# Patient Record
Sex: MALE | Race: BLACK OR AFRICAN AMERICAN | NOT HISPANIC OR LATINO | Employment: FULL TIME | URBAN - METROPOLITAN AREA
[De-identification: names, ages, dates, MRNs, and addresses within clinical notes are randomized per-mention and may not be internally consistent; named-entity substitution may affect disease eponyms.]

---

## 2018-11-25 ENCOUNTER — HOSPITAL ENCOUNTER (EMERGENCY)
Facility: HOSPITAL | Age: 23
Discharge: HOME | End: 2018-11-25
Attending: EMERGENCY MEDICINE | Admitting: EMERGENCY MEDICINE

## 2018-11-25 ENCOUNTER — APPOINTMENT (OUTPATIENT)
Dept: CT IMAGING | Facility: HOSPITAL | Age: 23
End: 2018-11-25

## 2018-11-25 VITALS
RESPIRATION RATE: 18 BRPM | DIASTOLIC BLOOD PRESSURE: 80 MMHG | WEIGHT: 156.53 LBS | OXYGEN SATURATION: 98 % | BODY MASS INDEX: 22.41 KG/M2 | HEART RATE: 72 BPM | HEIGHT: 70 IN | SYSTOLIC BLOOD PRESSURE: 122 MMHG | TEMPERATURE: 96.8 F

## 2018-11-25 DIAGNOSIS — K59.00 CONSTIPATION, UNSPECIFIED CONSTIPATION TYPE: Primary | ICD-10-CM

## 2018-11-25 LAB
ALBUMIN SERPL-MCNC: 4.3 G/DL (ref 3.5–5.2)
ALBUMIN/GLOB SERPL: 1.3 G/DL
ALP SERPL-CCNC: 48 U/L (ref 39–117)
ALT SERPL W P-5'-P-CCNC: 16 U/L (ref 1–41)
ANION GAP SERPL CALCULATED.3IONS-SCNC: 10 MMOL/L
AST SERPL-CCNC: 27 U/L (ref 1–40)
BASOPHILS # BLD AUTO: 0.01 10*3/MM3 (ref 0–0.2)
BASOPHILS NFR BLD AUTO: 0.2 % (ref 0–1.5)
BILIRUB SERPL-MCNC: 0.2 MG/DL (ref 0.1–1.2)
BILIRUB UR QL STRIP: NEGATIVE
BUN BLD-MCNC: 15 MG/DL (ref 6–20)
BUN/CREAT SERPL: 15.6 (ref 7–25)
CALCIUM SPEC-SCNC: 9.9 MG/DL (ref 8.6–10.5)
CHLORIDE SERPL-SCNC: 102 MMOL/L (ref 98–107)
CLARITY UR: CLEAR
CO2 SERPL-SCNC: 27 MMOL/L (ref 22–29)
COLOR UR: YELLOW
CREAT BLD-MCNC: 0.96 MG/DL (ref 0.76–1.27)
DEPRECATED RDW RBC AUTO: 43.2 FL (ref 37–54)
EOSINOPHIL # BLD AUTO: 0.19 10*3/MM3 (ref 0–0.7)
EOSINOPHIL NFR BLD AUTO: 3.6 % (ref 0.3–6.2)
ERYTHROCYTE [DISTWIDTH] IN BLOOD BY AUTOMATED COUNT: 13.4 % (ref 11.5–14.5)
GFR SERPL CREATININE-BSD FRML MDRD: 120 ML/MIN/1.73
GLOBULIN UR ELPH-MCNC: 3.3 GM/DL
GLUCOSE BLD-MCNC: 101 MG/DL (ref 65–99)
GLUCOSE UR STRIP-MCNC: NEGATIVE MG/DL
HCT VFR BLD AUTO: 48 % (ref 40.4–52.2)
HGB BLD-MCNC: 16 G/DL (ref 13.7–17.6)
HGB UR QL STRIP.AUTO: NEGATIVE
HOLD SPECIMEN: NORMAL
HOLD SPECIMEN: NORMAL
IMM GRANULOCYTES # BLD: 0 10*3/MM3 (ref 0–0.03)
IMM GRANULOCYTES NFR BLD: 0 % (ref 0–0.5)
KETONES UR QL STRIP: NEGATIVE
LEUKOCYTE ESTERASE UR QL STRIP.AUTO: NEGATIVE
LIPASE SERPL-CCNC: 20 U/L (ref 13–60)
LYMPHOCYTES # BLD AUTO: 2.74 10*3/MM3 (ref 0.9–4.8)
LYMPHOCYTES NFR BLD AUTO: 52 % (ref 19.6–45.3)
MCH RBC QN AUTO: 30 PG (ref 27–32.7)
MCHC RBC AUTO-ENTMCNC: 33.3 G/DL (ref 32.6–36.4)
MCV RBC AUTO: 89.9 FL (ref 79.8–96.2)
MONOCYTES # BLD AUTO: 0.44 10*3/MM3 (ref 0.2–1.2)
MONOCYTES NFR BLD AUTO: 8.3 % (ref 5–12)
NEUTROPHILS # BLD AUTO: 1.89 10*3/MM3 (ref 1.9–8.1)
NEUTROPHILS NFR BLD AUTO: 35.9 % (ref 42.7–76)
NITRITE UR QL STRIP: NEGATIVE
PH UR STRIP.AUTO: 5.5 [PH] (ref 5–8)
PLATELET # BLD AUTO: 174 10*3/MM3 (ref 140–500)
PMV BLD AUTO: 9.8 FL (ref 6–12)
POTASSIUM BLD-SCNC: 4.4 MMOL/L (ref 3.5–5.2)
PROT SERPL-MCNC: 7.6 G/DL (ref 6–8.5)
PROT UR QL STRIP: NEGATIVE
RBC # BLD AUTO: 5.34 10*6/MM3 (ref 4.6–6)
SODIUM BLD-SCNC: 139 MMOL/L (ref 136–145)
SP GR UR STRIP: 1.02 (ref 1–1.03)
UROBILINOGEN UR QL STRIP: NORMAL
WBC NRBC COR # BLD: 5.27 10*3/MM3 (ref 4.5–10.7)
WHOLE BLOOD HOLD SPECIMEN: NORMAL
WHOLE BLOOD HOLD SPECIMEN: NORMAL

## 2018-11-25 PROCEDURE — 74177 CT ABD & PELVIS W/CONTRAST: CPT

## 2018-11-25 PROCEDURE — 81003 URINALYSIS AUTO W/O SCOPE: CPT | Performed by: PHYSICIAN ASSISTANT

## 2018-11-25 PROCEDURE — 83690 ASSAY OF LIPASE: CPT | Performed by: PHYSICIAN ASSISTANT

## 2018-11-25 PROCEDURE — 85025 COMPLETE CBC W/AUTO DIFF WBC: CPT | Performed by: PHYSICIAN ASSISTANT

## 2018-11-25 PROCEDURE — 99284 EMERGENCY DEPT VISIT MOD MDM: CPT

## 2018-11-25 PROCEDURE — 25010000002 IOPAMIDOL 61 % SOLUTION: Performed by: EMERGENCY MEDICINE

## 2018-11-25 PROCEDURE — 80053 COMPREHEN METABOLIC PANEL: CPT | Performed by: PHYSICIAN ASSISTANT

## 2018-11-25 RX ORDER — POLYETHYLENE GLYCOL 3350 17 G/17G
POWDER, FOR SOLUTION ORAL
Qty: 250 G | Refills: 0 | Status: SHIPPED | OUTPATIENT
Start: 2018-11-25 | End: 2022-01-20

## 2018-11-25 RX ADMIN — IOPAMIDOL 85 ML: 612 INJECTION, SOLUTION INTRAVENOUS at 18:24

## 2018-11-25 NOTE — ED PROVIDER NOTES
MD ATTESTATION NOTE    The SAGE and I have discussed this patient's history, physical exam, and treatment plan.  I have reviewed the documentation and personally had a face to face interaction with the patient. I affirm the documentation and agree with the treatment and plan.  The attached note describes my personal findings.    Pt presents with abdominal pain that began a couple of days ago. He states that it is worse when he eats some food but better when he drinks fresh milk. He also complains of nausea but he denies vomiting.     Physical Exam  Abdomen: suprapubic and RLQ abdominal pain    Plan: review CT scans and determine treatment plan.       Reviewed CT abd/pelvis which shows a moderate amount of stool. There is no impaction, appendicitis, or colitis. Independently viewed by me. Interpreted by radiologist. Discussed with Dr. Lagunas.    1840  I informed Adriana Lovell PA-C of the CT abd/pelvis results. She will inform Pt and manage his disposition.         Luzma Zheng  11/25/18 1840       Les Kimbrough MD  11/25/18 1918

## 2018-11-25 NOTE — ED PROVIDER NOTES
EMERGENCY DEPARTMENT ENCOUNTER    Room Number:  24/24  Date seen:  11/25/2018  Time seen: 4:12 PM  PCP: Provider, No Known    HPI:  Chief complaint:Abdomen  Context:Jd Garcia is a 23 y.o. male who presents to the ED with c/o intermittent lower abdominal pain that began a couple of days ago, currently mild. Pt states the pain was severe this morning. Pt states abdominal pain is worse when he is anxious or eating certain foods, and pain is improved with drinking fresh milk or water. Pt also c/o nausea. Pt denies vomiting, diarrhea, hematuria, dysuria, but does report having some issues with constipation recently. Pt's last BM was one day ago. Pt denies Hx of abd surgeries or medical problems. Pt doesn't take any medications. HPI limited due to pt only speaking Upper sorbian.     History obtained using Upper sorbian  ID number= [id number]    Onset: gradual  Location:lower abdomen  Radiation: none  Duration: couple of days  Timing: intermittent  Character:pain  Aggravating Factors: anxious or eating certain foods  Alleviating Factors: drinking fresh milk or water  Severity: currently mild, severe this morning    ALLERGIES  Patient has no known allergies.    PAST MEDICAL HISTORY  Active Ambulatory Problems     Diagnosis Date Noted   • No Active Ambulatory Problems     Resolved Ambulatory Problems     Diagnosis Date Noted   • No Resolved Ambulatory Problems     No Additional Past Medical History       PAST SURGICAL HISTORY  History reviewed. No pertinent surgical history.    FAMILY HISTORY  History reviewed. No pertinent family history.    SOCIAL HISTORY  Social History     Socioeconomic History   • Marital status: Single     Spouse name: Not on file   • Number of children: Not on file   • Years of education: Not on file   • Highest education level: Not on file   Social Needs   • Financial resource strain: Not on file   • Food insecurity - worry: Not on file   • Food insecurity - inability: Not on file   • Transportation  needs - medical: Not on file   • Transportation needs - non-medical: Not on file   Occupational History   • Not on file   Tobacco Use   • Smoking status: Never Smoker   • Smokeless tobacco: Never Used   Substance and Sexual Activity   • Alcohol use: Not on file     Comment: rarely    • Drug use: No   • Sexual activity: Defer   Other Topics Concern   • Not on file   Social History Narrative   • Not on file       REVIEW OF SYSTEMS  Review of Systems   Constitutional: Negative for chills and fever.   HENT: Negative.    Eyes: Negative.    Respiratory: Negative for shortness of breath.    Cardiovascular: Negative for chest pain.   Gastrointestinal: Positive for abdominal pain (intermittent lower), constipation and nausea. Negative for diarrhea and vomiting.   Genitourinary: Negative.  Negative for dysuria and hematuria.   Musculoskeletal: Negative.    Skin: Negative.    Neurological: Negative.    Psychiatric/Behavioral: Negative.        PHYSICAL EXAM  ED Triage Vitals [11/25/18 1601]   Temp Heart Rate Resp BP SpO2   96.1 °F (35.6 °C) 94 18 -- 97 %      Temp src Heart Rate Source Patient Position BP Location FiO2 (%)   Tympanic Monitor -- -- --     Physical Exam   Constitutional: He is oriented to person, place, and time and well-developed, well-nourished, and in no distress.   HENT:   Head: Normocephalic and atraumatic.   Right Ear: External ear normal.   Left Ear: External ear normal.   Nose: Nose normal.   Eyes: Conjunctivae are normal.   Neck: Normal range of motion.   Cardiovascular: Normal rate and regular rhythm.   Pulmonary/Chest: Effort normal and breath sounds normal.   Abdominal: There is no tenderness.   Normal bowel sounds  Soft  Nontender  Nondistended  No rebound, guarding, or rigidity  No appreciable organomegaly  No CVA tenderness     Musculoskeletal: Normal range of motion.   Neurological: He is alert and oriented to person, place, and time.   Skin: Skin is warm and dry.   Psychiatric: Affect normal.    Nursing note and vitals reviewed.      LAB RESULTS  Recent Results (from the past 24 hour(s))   Comprehensive Metabolic Panel    Collection Time: 11/25/18  4:58 PM   Result Value Ref Range    Glucose 101 (H) 65 - 99 mg/dL    BUN 15 6 - 20 mg/dL    Creatinine 0.96 0.76 - 1.27 mg/dL    Sodium 139 136 - 145 mmol/L    Potassium 4.4 3.5 - 5.2 mmol/L    Chloride 102 98 - 107 mmol/L    CO2 27.0 22.0 - 29.0 mmol/L    Calcium 9.9 8.6 - 10.5 mg/dL    Total Protein 7.6 6.0 - 8.5 g/dL    Albumin 4.30 3.50 - 5.20 g/dL    ALT (SGPT) 16 1 - 41 U/L    AST (SGOT) 27 1 - 40 U/L    Alkaline Phosphatase 48 39 - 117 U/L    Total Bilirubin 0.2 0.1 - 1.2 mg/dL    eGFR  African Amer 120 >60 mL/min/1.73    Globulin 3.3 gm/dL    A/G Ratio 1.3 g/dL    BUN/Creatinine Ratio 15.6 7.0 - 25.0    Anion Gap 10.0 mmol/L   Lipase    Collection Time: 11/25/18  4:58 PM   Result Value Ref Range    Lipase 20 13 - 60 U/L   CBC Auto Differential    Collection Time: 11/25/18  4:58 PM   Result Value Ref Range    WBC 5.27 4.50 - 10.70 10*3/mm3    RBC 5.34 4.60 - 6.00 10*6/mm3    Hemoglobin 16.0 13.7 - 17.6 g/dL    Hematocrit 48.0 40.4 - 52.2 %    MCV 89.9 79.8 - 96.2 fL    MCH 30.0 27.0 - 32.7 pg    MCHC 33.3 32.6 - 36.4 g/dL    RDW 13.4 11.5 - 14.5 %    RDW-SD 43.2 37.0 - 54.0 fl    MPV 9.8 6.0 - 12.0 fL    Platelets 174 140 - 500 10*3/mm3    Neutrophil % 35.9 (L) 42.7 - 76.0 %    Lymphocyte % 52.0 (H) 19.6 - 45.3 %    Monocyte % 8.3 5.0 - 12.0 %    Eosinophil % 3.6 0.3 - 6.2 %    Basophil % 0.2 0.0 - 1.5 %    Immature Grans % 0.0 0.0 - 0.5 %    Neutrophils, Absolute 1.89 (L) 1.90 - 8.10 10*3/mm3    Lymphocytes, Absolute 2.74 0.90 - 4.80 10*3/mm3    Monocytes, Absolute 0.44 0.20 - 1.20 10*3/mm3    Eosinophils, Absolute 0.19 0.00 - 0.70 10*3/mm3    Basophils, Absolute 0.01 0.00 - 0.20 10*3/mm3    Immature Grans, Absolute 0.00 0.00 - 0.03 10*3/mm3   Light Blue Top    Collection Time: 11/25/18  4:58 PM   Result Value Ref Range    Extra Tube hold for add-on     Green Top (Gel)    Collection Time: 11/25/18  4:58 PM   Result Value Ref Range    Extra Tube Hold for add-ons.    Lavender Top    Collection Time: 11/25/18  4:58 PM   Result Value Ref Range    Extra Tube hold for add-on    Gold Top - SST    Collection Time: 11/25/18  4:58 PM   Result Value Ref Range    Extra Tube Hold for add-ons.    Urinalysis With Microscopic If Indicated (No Culture) - Urine, Clean Catch    Collection Time: 11/25/18  6:05 PM   Result Value Ref Range    Color, UA Yellow Yellow, Straw    Appearance, UA Clear Clear    pH, UA 5.5 5.0 - 8.0    Specific Gravity, UA 1.019 1.005 - 1.030    Glucose, UA Negative Negative    Ketones, UA Negative Negative    Bilirubin, UA Negative Negative    Blood, UA Negative Negative    Protein, UA Negative Negative    Leuk Esterase, UA Negative Negative    Nitrite, UA Negative Negative    Urobilinogen, UA 0.2 E.U./dL 0.2 - 1.0 E.U./dL       I ordered the above labs and reviewed the results    RADIOLOGY  CT Abdomen Pelvis With Contrast   Final Result          I ordered the above noted radiological studies and reviewed the images on the PACS system.     MEDICATIONS GIVEN IN ER  Medications   iopamidol (ISOVUE-300) 61 % injection 100 mL (85 mL Intravenous Given 11/25/18 1824)       PROCEDURES  Procedures      PROGRESS AND CONSULTS    Progress Notes:       1625  Discussed the plan to further evaluate with lab work and UA. Pt is agreeable.     1650 Reviewed pt's history and workup with Dr. Kimbrough.  After a bedside evaluation; Dr Kimbrough agrees with the plan of care.    1653  Ordered CT abd/pelvis    1853  Pt recheck. Pt is resting in bed comfortably. Irish  used (ID = [id number]). Notified pt of lab work, including normal UA, normal electrolytes, and normal lipase, and CT abd pelvis that shows constipation otherwise unremarkable. Discussed plan to discharge pt home with prescription for a stool softener. Pt understands and agrees with plan, all concerns  "addressed.  Return precautions and follow up instructions discussed.      Disposition vitals:  /80   Pulse 72   Temp 96.8 °F (36 °C)   Resp 18   Ht 178 cm (70.08\")   Wt 71 kg (156 lb 8.4 oz)   SpO2 98%   BMI 22.41 kg/m²       DIAGNOSIS  Final diagnoses:   Constipation, unspecified constipation type       FOLLOW UP   [facility] [id number]  [phone]  Schedule an appointment as soon as possible for a visit in 2 days  to establish a primary care provider      RX     Medication List      New Prescriptions    polyethylene glycol powder  Commonly known as:  MIRALAX  Dissolve 1 heaping tablespoon into water or juice and drink.            Documentation assistance provided by gaye Valentin for Mayra Lovell PA-C.  Information recorded by the scribe was done at my direction and has been verified and validated by me.                  Erasto Valentin  11/25/18 1903       Mayra Lovell PA  11/25/18 2009    "

## 2018-11-25 NOTE — DISCHARGE INSTRUCTIONS
Normal diet as tolerated. Drink plenty of fluids.  Take the medications as prescribed and you may discontinue it after you get good results.  Return to the ER for severe pain, intractable vomiting, fever >100.4, new or worsening symptoms, any concerns.

## 2020-06-29 ENCOUNTER — HOSPITAL ENCOUNTER (EMERGENCY)
Facility: HOSPITAL | Age: 25
Discharge: HOME | End: 2020-06-29
Attending: EMERGENCY MEDICINE | Admitting: EMERGENCY MEDICINE

## 2020-06-29 ENCOUNTER — APPOINTMENT (OUTPATIENT)
Dept: GENERAL RADIOLOGY | Facility: HOSPITAL | Age: 25
End: 2020-06-29

## 2020-06-29 VITALS
WEIGHT: 160 LBS | HEART RATE: 80 BPM | SYSTOLIC BLOOD PRESSURE: 120 MMHG | OXYGEN SATURATION: 99 % | BODY MASS INDEX: 20.53 KG/M2 | RESPIRATION RATE: 15 BRPM | DIASTOLIC BLOOD PRESSURE: 79 MMHG | HEIGHT: 74 IN | TEMPERATURE: 98.1 F

## 2020-06-29 DIAGNOSIS — M79.18 MUSCULOSKELETAL PAIN: ICD-10-CM

## 2020-06-29 DIAGNOSIS — M25.511 ACUTE PAIN OF RIGHT SHOULDER: Primary | ICD-10-CM

## 2020-06-29 PROCEDURE — 99282 EMERGENCY DEPT VISIT SF MDM: CPT

## 2020-06-29 PROCEDURE — 73030 X-RAY EXAM OF SHOULDER: CPT

## 2020-06-29 RX ORDER — IBUPROFEN 600 MG/1
600 TABLET ORAL EVERY 6 HOURS PRN
Qty: 30 TABLET | Refills: 0 | Status: SHIPPED | OUTPATIENT
Start: 2020-06-29 | End: 2022-01-20

## 2020-06-29 RX ORDER — ACETAMINOPHEN 500 MG
1000 TABLET ORAL EVERY 8 HOURS PRN
Qty: 30 TABLET | Refills: 0 | Status: SHIPPED | OUTPATIENT
Start: 2020-06-29 | End: 2020-08-20

## 2020-08-20 ENCOUNTER — OFFICE VISIT (OUTPATIENT)
Dept: FAMILY MEDICINE CLINIC | Facility: CLINIC | Age: 25
End: 2020-08-20

## 2020-08-20 VITALS
DIASTOLIC BLOOD PRESSURE: 76 MMHG | WEIGHT: 168.2 LBS | SYSTOLIC BLOOD PRESSURE: 112 MMHG | OXYGEN SATURATION: 98 % | BODY MASS INDEX: 22.78 KG/M2 | TEMPERATURE: 98.2 F | HEIGHT: 72 IN | HEART RATE: 80 BPM

## 2020-08-20 DIAGNOSIS — N20.0 KIDNEY STONE: ICD-10-CM

## 2020-08-20 DIAGNOSIS — M54.40 BILATERAL LOW BACK PAIN WITH SCIATICA, SCIATICA LATERALITY UNSPECIFIED, UNSPECIFIED CHRONICITY: ICD-10-CM

## 2020-08-20 DIAGNOSIS — K59.04 CHRONIC IDIOPATHIC CONSTIPATION: ICD-10-CM

## 2020-08-20 DIAGNOSIS — Z00.00 PHYSICAL EXAM, ANNUAL: Primary | ICD-10-CM

## 2020-08-20 PROCEDURE — 99385 PREV VISIT NEW AGE 18-39: CPT | Performed by: INTERNAL MEDICINE

## 2020-08-20 NOTE — PROGRESS NOTES
Subjective   Jd Garcia is a 25 y.o. male.     History of Present Illness   Patient was seen for a physical.  Patient's diet and physical activity were discussed at this visit.  Patient does have chronic constipation was placed on Metamucil 2 to 3 tablespoons 8 ounces of water daily.    Dictated utilizing Dragon dictation. If there are questions or for further clarification, please contact me.  The following portions of the patient's history were reviewed and updated as appropriate: allergies, current medications, past family history, past medical history, past social history, past surgical history and problem list.    Review of Systems   Constitutional: Negative for fatigue and fever.   HENT: Positive for congestion. Negative for trouble swallowing.    Eyes: Negative for discharge and visual disturbance.   Respiratory: Negative for choking and shortness of breath.    Cardiovascular: Negative for chest pain and palpitations.   Gastrointestinal: Positive for constipation. Negative for abdominal pain and blood in stool.   Endocrine: Negative.    Genitourinary: Negative for genital sores and hematuria.   Musculoskeletal: Negative for gait problem and joint swelling.   Skin: Negative for color change, pallor, rash and wound.   Allergic/Immunologic: Positive for environmental allergies. Negative for immunocompromised state.   Neurological: Negative for facial asymmetry and speech difficulty.   Psychiatric/Behavioral: Negative for hallucinations and suicidal ideas.       Objective   Physical Exam   Constitutional: He is oriented to person, place, and time. He appears well-developed and well-nourished. No distress.   HENT:   Head: Normocephalic and atraumatic.   Right Ear: External ear normal.   Left Ear: External ear normal.   Nose: Nose normal.   Mouth/Throat: Oropharynx is clear and moist. No oropharyngeal exudate.   Eyes: Pupils are equal, round, and reactive to light. Conjunctivae and EOM are normal. Right eye  exhibits no discharge. Left eye exhibits no discharge. No scleral icterus.   Neck: Normal range of motion. Neck supple. No JVD present. No tracheal deviation present. No thyromegaly present.   Cardiovascular: Normal rate, regular rhythm, normal heart sounds and intact distal pulses. Exam reveals no gallop and no friction rub.   No murmur heard.  Pulmonary/Chest: Effort normal and breath sounds normal. No stridor. No respiratory distress. He has no wheezes. He has no rales. He exhibits no tenderness.   Abdominal: Soft. Bowel sounds are normal. He exhibits no distension and no mass. There is no tenderness. There is no rebound and no guarding. No hernia.   Musculoskeletal: Normal range of motion. He exhibits no edema, tenderness or deformity.   Lymphadenopathy:     He has no cervical adenopathy.   Neurological: He is alert and oriented to person, place, and time. He displays normal reflexes. No sensory deficit. He exhibits normal muscle tone. Coordination normal.   Skin: Skin is warm and dry. No rash noted. He is not diaphoretic. No erythema. No pallor.   Psychiatric: He has a normal mood and affect. His behavior is normal. Judgment and thought content normal.   Nursing note and vitals reviewed.      Assessment/Plan 1 physical #2 labs  Jd was seen today for new pcp.    Diagnoses and all orders for this visit:    Physical exam, annual    Chronic idiopathic constipation  -     CBC (No Diff); Future  -     Comprehensive Metabolic Panel  -     Lipid Panel  -     Vitamin D 25 Hydroxy    Bilateral low back pain with sciatica, sciatica laterality unspecified, unspecified chronicity  -     CBC (No Diff); Future  -     Comprehensive Metabolic Panel  -     Lipid Panel  -     Vitamin D 25 Hydroxy    Kidney stone  -     CBC (No Diff); Future  -     Comprehensive Metabolic Panel  -     Lipid Panel  -     Vitamin D 25 Hydroxy    Other orders  -     psyllium (Metamucil Smooth Texture) 58.6 % powder; 2-3 tablespoon in 8oz  water

## 2020-08-20 NOTE — PATIENT INSTRUCTIONS
"DASH Eating Plan  DASH stands for \"Dietary Approaches to Stop Hypertension.\" The DASH eating plan is a healthy eating plan that has been shown to reduce high blood pressure (hypertension). It may also reduce your risk for type 2 diabetes, heart disease, and stroke. The DASH eating plan may also help with weight loss.  What are tips for following this plan?    General guidelines  · Avoid eating more than 2,300 mg (milligrams) of salt (sodium) a day. If you have hypertension, you may need to reduce your sodium intake to 1,500 mg a day.  · Limit alcohol intake to no more than 1 drink a day for nonpregnant women and 2 drinks a day for men. One drink equals 12 oz of beer, 5 oz of wine, or 1½ oz of hard liquor.  · Work with your health care provider to maintain a healthy body weight or to lose weight. Ask what an ideal weight is for you.  · Get at least 30 minutes of exercise that causes your heart to beat faster (aerobic exercise) most days of the week. Activities may include walking, swimming, or biking.  · Work with your health care provider or diet and nutrition specialist (dietitian) to adjust your eating plan to your individual calorie needs.  Reading food labels    · Check food labels for the amount of sodium per serving. Choose foods with less than 5 percent of the Daily Value of sodium. Generally, foods with less than 300 mg of sodium per serving fit into this eating plan.  · To find whole grains, look for the word \"whole\" as the first word in the ingredient list.  Shopping  · Buy products labeled as \"low-sodium\" or \"no salt added.\"  · Buy fresh foods. Avoid canned foods and premade or frozen meals.  Cooking  · Avoid adding salt when cooking. Use salt-free seasonings or herbs instead of table salt or sea salt. Check with your health care provider or pharmacist before using salt substitutes.  · Do not bueno foods. Cook foods using healthy methods such as baking, boiling, grilling, and broiling instead.  · Cook with " heart-healthy oils, such as olive, canola, soybean, or sunflower oil.  Meal planning  · Eat a balanced diet that includes:  ? 5 or more servings of fruits and vegetables each day. At each meal, try to fill half of your plate with fruits and vegetables.  ? Up to 6-8 servings of whole grains each day.  ? Less than 6 oz of lean meat, poultry, or fish each day. A 3-oz serving of meat is about the same size as a deck of cards. One egg equals 1 oz.  ? 2 servings of low-fat dairy each day.  ? A serving of nuts, seeds, or beans 5 times each week.  ? Heart-healthy fats. Healthy fats called Omega-3 fatty acids are found in foods such as flaxseeds and coldwater fish, like sardines, salmon, and mackerel.  · Limit how much you eat of the following:  ? Canned or prepackaged foods.  ? Food that is high in trans fat, such as fried foods.  ? Food that is high in saturated fat, such as fatty meat.  ? Sweets, desserts, sugary drinks, and other foods with added sugar.  ? Full-fat dairy products.  · Do not salt foods before eating.  · Try to eat at least 2 vegetarian meals each week.  · Eat more home-cooked food and less restaurant, buffet, and fast food.  · When eating at a restaurant, ask that your food be prepared with less salt or no salt, if possible.  What foods are recommended?  The items listed may not be a complete list. Talk with your dietitian about what dietary choices are best for you.  Grains  Whole-grain or whole-wheat bread. Whole-grain or whole-wheat pasta. Brown rice. Oatmeal. Quinoa. Bulgur. Whole-grain and low-sodium cereals. Angelica bread. Low-fat, low-sodium crackers. Whole-wheat flour tortillas.  Vegetables  Fresh or frozen vegetables (raw, steamed, roasted, or grilled). Low-sodium or reduced-sodium tomato and vegetable juice. Low-sodium or reduced-sodium tomato sauce and tomato paste. Low-sodium or reduced-sodium canned vegetables.  Fruits  All fresh, dried, or frozen fruit. Canned fruit in natural juice (without  added sugar).  Meat and other protein foods  Skinless chicken or turkey. Ground chicken or turkey. Pork with fat trimmed off. Fish and seafood. Egg whites. Dried beans, peas, or lentils. Unsalted nuts, nut butters, and seeds. Unsalted canned beans. Lean cuts of beef with fat trimmed off. Low-sodium, lean deli meat.  Dairy  Low-fat (1%) or fat-free (skim) milk. Fat-free, low-fat, or reduced-fat cheeses. Nonfat, low-sodium ricotta or cottage cheese. Low-fat or nonfat yogurt. Low-fat, low-sodium cheese.  Fats and oils  Soft margarine without trans fats. Vegetable oil. Low-fat, reduced-fat, or light mayonnaise and salad dressings (reduced-sodium). Canola, safflower, olive, soybean, and sunflower oils. Avocado.  Seasoning and other foods  Herbs. Spices. Seasoning mixes without salt. Unsalted popcorn and pretzels. Fat-free sweets.  What foods are not recommended?  The items listed may not be a complete list. Talk with your dietitian about what dietary choices are best for you.  Grains  Baked goods made with fat, such as croissants, muffins, or some breads. Dry pasta or rice meal packs.  Vegetables  Creamed or fried vegetables. Vegetables in a cheese sauce. Regular canned vegetables (not low-sodium or reduced-sodium). Regular canned tomato sauce and paste (not low-sodium or reduced-sodium). Regular tomato and vegetable juice (not low-sodium or reduced-sodium). Pickles. Olives.  Fruits  Canned fruit in a light or heavy syrup. Fried fruit. Fruit in cream or butter sauce.  Meat and other protein foods  Fatty cuts of meat. Ribs. Fried meat. Auguste. Sausage. Bologna and other processed lunch meats. Salami. Fatback. Hotdogs. Bratwurst. Salted nuts and seeds. Canned beans with added salt. Canned or smoked fish. Whole eggs or egg yolks. Chicken or turkey with skin.  Dairy  Whole or 2% milk, cream, and half-and-half. Whole or full-fat cream cheese. Whole-fat or sweetened yogurt. Full-fat cheese. Nondairy creamers. Whipped toppings.  Processed cheese and cheese spreads.  Fats and oils  Butter. Stick margarine. Lard. Shortening. Ghee. Auguste fat. Tropical oils, such as coconut, palm kernel, or palm oil.  Seasoning and other foods  Salted popcorn and pretzels. Onion salt, garlic salt, seasoned salt, table salt, and sea salt. Worcestershire sauce. Tartar sauce. Barbecue sauce. Teriyaki sauce. Soy sauce, including reduced-sodium. Steak sauce. Canned and packaged gravies. Fish sauce. Oyster sauce. Cocktail sauce. Horseradish that you find on the shelf. Ketchup. Mustard. Meat flavorings and tenderizers. Bouillon cubes. Hot sauce and Tabasco sauce. Premade or packaged marinades. Premade or packaged taco seasonings. Relishes. Regular salad dressings.  Where to find more information:  · National Heart, Lung, and Blood [place]: www.nhlbi.nih.gov  · American Heart Association: www.heart.org  Summary  · The DASH eating plan is a healthy eating plan that has been shown to reduce high blood pressure (hypertension). It may also reduce your risk for type 2 diabetes, heart disease, and stroke.  · With the DASH eating plan, you should limit salt (sodium) intake to 2,300 mg a day. If you have hypertension, you may need to reduce your sodium intake to 1,500 mg a day.  · When on the DASH eating plan, aim to eat more fresh fruits and vegetables, whole grains, lean proteins, low-fat dairy, and heart-healthy fats.  · Work with your health care provider or diet and nutrition specialist (dietitian) to adjust your eating plan to your individual calorie needs.  This information is not intended to replace advice given to you by your health care provider. Make sure you discuss any questions you have with your health care provider.  Document Released: 12/06/2012 Document Revised: 11/30/2018 Document Reviewed: 12/11/2017  Elsevier Patient Education © 2020 [employer]

## 2021-04-16 ENCOUNTER — BULK ORDERING (OUTPATIENT)
Dept: CASE MANAGEMENT | Facility: OTHER | Age: 26
End: 2021-04-16

## 2021-04-16 DIAGNOSIS — Z23 IMMUNIZATION DUE: ICD-10-CM

## 2021-05-24 ENCOUNTER — IMMUNIZATION (OUTPATIENT)
Dept: VACCINE CLINIC | Facility: HOSPITAL | Age: 26
End: 2021-05-24

## 2021-05-24 PROCEDURE — 91300 HC SARSCOV02 VAC 30MCG/0.3ML IM: CPT | Performed by: INTERNAL MEDICINE

## 2021-05-24 PROCEDURE — 0001A: CPT | Performed by: INTERNAL MEDICINE

## 2021-06-14 ENCOUNTER — APPOINTMENT (OUTPATIENT)
Dept: VACCINE CLINIC | Facility: HOSPITAL | Age: 26
End: 2021-06-14

## 2022-01-20 ENCOUNTER — OFFICE VISIT (OUTPATIENT)
Dept: FAMILY MEDICINE CLINIC | Facility: CLINIC | Age: 27
End: 2022-01-20

## 2022-01-20 VITALS
TEMPERATURE: 99.5 F | WEIGHT: 180.8 LBS | HEART RATE: 94 BPM | OXYGEN SATURATION: 98 % | DIASTOLIC BLOOD PRESSURE: 72 MMHG | BODY MASS INDEX: 22.48 KG/M2 | SYSTOLIC BLOOD PRESSURE: 118 MMHG | HEIGHT: 75 IN

## 2022-01-20 DIAGNOSIS — Z00.00 PHYSICAL EXAM, ANNUAL: Primary | ICD-10-CM

## 2022-01-20 DIAGNOSIS — G89.29 CHRONIC LOW BACK PAIN WITH SCIATICA, SCIATICA LATERALITY UNSPECIFIED, UNSPECIFIED BACK PAIN LATERALITY: ICD-10-CM

## 2022-01-20 DIAGNOSIS — Z20.2 STD EXPOSURE: ICD-10-CM

## 2022-01-20 DIAGNOSIS — R10.13 EPIGASTRIC PAIN: ICD-10-CM

## 2022-01-20 DIAGNOSIS — M54.40 CHRONIC LOW BACK PAIN WITH SCIATICA, SCIATICA LATERALITY UNSPECIFIED, UNSPECIFIED BACK PAIN LATERALITY: ICD-10-CM

## 2022-01-20 DIAGNOSIS — Z11.3 SCREEN FOR STD (SEXUALLY TRANSMITTED DISEASE): ICD-10-CM

## 2022-01-20 DIAGNOSIS — R07.89 CHEST WALL PAIN: ICD-10-CM

## 2022-01-20 LAB
25(OH)D3 SERPL-MCNC: 24.5 NG/ML (ref 30–100)
ALBUMIN SERPL-MCNC: 4.8 G/DL (ref 3.5–5.2)
ALBUMIN/GLOB SERPL: 1.7 G/DL
ALP SERPL-CCNC: 51 U/L (ref 39–117)
ALT SERPL W P-5'-P-CCNC: 41 U/L (ref 1–41)
ANION GAP SERPL CALCULATED.3IONS-SCNC: 10 MMOL/L (ref 5–15)
AST SERPL-CCNC: 21 U/L (ref 1–40)
BILIRUB SERPL-MCNC: 0.3 MG/DL (ref 0–1.2)
BUN SERPL-MCNC: 9 MG/DL (ref 6–20)
BUN/CREAT SERPL: 8.6 (ref 7–25)
CALCIUM SPEC-SCNC: 10.1 MG/DL (ref 8.6–10.5)
CHLORIDE SERPL-SCNC: 101 MMOL/L (ref 98–107)
CHOLEST SERPL-MCNC: 188 MG/DL (ref 0–200)
CO2 SERPL-SCNC: 29 MMOL/L (ref 22–29)
CREAT SERPL-MCNC: 1.05 MG/DL (ref 0.76–1.27)
DEPRECATED RDW RBC AUTO: 44.5 FL (ref 37–54)
ERYTHROCYTE [DISTWIDTH] IN BLOOD BY AUTOMATED COUNT: 13.4 % (ref 12.3–15.4)
GFR SERPL CREATININE-BSD FRML MDRD: 104 ML/MIN/1.73
GLOBULIN UR ELPH-MCNC: 2.9 GM/DL
GLUCOSE SERPL-MCNC: 92 MG/DL (ref 65–99)
HCT VFR BLD AUTO: 49.2 % (ref 37.5–51)
HDLC SERPL-MCNC: 48 MG/DL (ref 40–60)
HGB BLD-MCNC: 16 G/DL (ref 13–17.7)
HIV1+2 AB SER QL: NORMAL
LDLC SERPL CALC-MCNC: 128 MG/DL (ref 0–100)
LDLC/HDLC SERPL: 2.66 {RATIO}
MCH RBC QN AUTO: 29.4 PG (ref 26.6–33)
MCHC RBC AUTO-ENTMCNC: 32.5 G/DL (ref 31.5–35.7)
MCV RBC AUTO: 90.4 FL (ref 79–97)
PLATELET # BLD AUTO: 217 10*3/MM3 (ref 140–450)
PMV BLD AUTO: 10.5 FL (ref 6–12)
POTASSIUM SERPL-SCNC: 5 MMOL/L (ref 3.5–5.2)
PROT SERPL-MCNC: 7.7 G/DL (ref 6–8.5)
RBC # BLD AUTO: 5.44 10*6/MM3 (ref 4.14–5.8)
SODIUM SERPL-SCNC: 140 MMOL/L (ref 136–145)
TRIGL SERPL-MCNC: 62 MG/DL (ref 0–150)
VLDLC SERPL-MCNC: 12 MG/DL (ref 5–40)
WBC NRBC COR # BLD: 4.03 10*3/MM3 (ref 3.4–10.8)

## 2022-01-20 PROCEDURE — 3008F BODY MASS INDEX DOCD: CPT | Performed by: INTERNAL MEDICINE

## 2022-01-20 PROCEDURE — 85027 COMPLETE CBC AUTOMATED: CPT | Performed by: INTERNAL MEDICINE

## 2022-01-20 PROCEDURE — 99395 PREV VISIT EST AGE 18-39: CPT | Performed by: INTERNAL MEDICINE

## 2022-01-20 PROCEDURE — G0432 EIA HIV-1/HIV-2 SCREEN: HCPCS | Performed by: INTERNAL MEDICINE

## 2022-01-20 PROCEDURE — 86592 SYPHILIS TEST NON-TREP QUAL: CPT | Performed by: INTERNAL MEDICINE

## 2022-01-20 PROCEDURE — 82306 VITAMIN D 25 HYDROXY: CPT | Performed by: INTERNAL MEDICINE

## 2022-01-20 PROCEDURE — 36415 COLL VENOUS BLD VENIPUNCTURE: CPT | Performed by: INTERNAL MEDICINE

## 2022-01-20 PROCEDURE — 80061 LIPID PANEL: CPT | Performed by: INTERNAL MEDICINE

## 2022-01-20 PROCEDURE — 80053 COMPREHEN METABOLIC PANEL: CPT | Performed by: INTERNAL MEDICINE

## 2022-01-20 RX ORDER — CYCLOBENZAPRINE HCL 10 MG
10 TABLET ORAL 3 TIMES DAILY PRN
Qty: 30 TABLET | Refills: 3 | Status: SHIPPED | OUTPATIENT
Start: 2022-01-20

## 2022-01-20 RX ORDER — TRAMADOL HYDROCHLORIDE 50 MG/1
50 TABLET ORAL EVERY 8 HOURS PRN
Qty: 90 TABLET | Refills: 3 | Status: SHIPPED | OUTPATIENT
Start: 2022-01-20

## 2022-01-20 RX ORDER — OMEPRAZOLE 40 MG/1
40 CAPSULE, DELAYED RELEASE ORAL DAILY
Qty: 30 CAPSULE | Refills: 3 | Status: SHIPPED | OUTPATIENT
Start: 2022-01-20 | End: 2022-05-09

## 2022-01-20 NOTE — PROGRESS NOTES
Subjective   Jd Garcia is a 27 y.o. male.     Vitals:    01/20/22 1412   BP: 118/72   Pulse: 94   Temp: 99.5 °F (37.5 °C)   SpO2: 98%      Body mass index is 22.6 kg/m².     History of Present Illness   Patient was seen for a physical. Patient diet and physical activity were discussed at this visit. 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 Patient strained his anterior chest 1 day and developed moderate to severe pain. This occurred about 3 months ago. Pain is in the sternal area and is worse with deep breathing or lifting. Patient is going to the hospital for x-ray. Patient also started developing severe lower back pain. It occurred around the same time. Pain is located in the lower lumbar area and does not radiate. Pain is moderate to severe in nature and last for several hours. Patient also develops epigastric pain and was put on omeprazole 40 mg daily. Patient was put on tramadol 50 mg with two Tylenol p.o. 3 times daily, Flexeril 10 mg p.o. nightly as needed muscle spasm, physical therapy. Patient will follow-up in 1 month.    Dictated utilizing Dragon dictation. If there are questions or for further clarification, please contact me.  The following portions of the patient's history were reviewed and updated as appropriate: allergies, current medications, past family history, past medical history, past social history, past surgical history and problem list.    Review of Systems   Constitutional: Negative for fatigue and fever.   HENT: Positive for congestion. Negative for trouble swallowing.    Eyes: Negative for discharge and visual disturbance.   Respiratory: Negative for choking and shortness of breath.    Cardiovascular: Positive for chest pain. Negative for palpitations.   Gastrointestinal: Positive for abdominal pain. Negative for blood in stool.   Endocrine: Negative.    Genitourinary: Negative for genital sores and hematuria.   Musculoskeletal: Positive for  back pain. Negative for gait problem and joint swelling.   Skin: Negative for color change, pallor, rash and wound.   Allergic/Immunologic: Positive for environmental allergies. Negative for immunocompromised state.   Neurological: Negative for facial asymmetry and speech difficulty.   Psychiatric/Behavioral: Negative for hallucinations and suicidal ideas.       Objective   Physical Exam  Vitals and nursing note reviewed.   Constitutional:       Appearance: Normal appearance. He is well-developed.   HENT:      Head: Normocephalic and atraumatic.      Nose: Nose normal.      Mouth/Throat:      Mouth: Mucous membranes are moist.      Pharynx: Oropharynx is clear.   Eyes:      Extraocular Movements: Extraocular movements intact.      Conjunctiva/sclera: Conjunctivae normal.      Pupils: Pupils are equal, round, and reactive to light.   Cardiovascular:      Rate and Rhythm: Normal rate and regular rhythm.      Heart sounds: Normal heart sounds. No murmur heard.  No friction rub. No gallop.    Pulmonary:      Effort: Pulmonary effort is normal. No respiratory distress.      Breath sounds: Normal breath sounds. No stridor. No wheezing, rhonchi or rales.   Chest:      Chest wall: Tenderness present.   Abdominal:      General: Bowel sounds are normal.      Palpations: Abdomen is soft.      Tenderness: There is abdominal tenderness.   Musculoskeletal:         General: Tenderness present. Normal range of motion.      Cervical back: Normal range of motion and neck supple.   Skin:     General: Skin is warm and dry.   Neurological:      General: No focal deficit present.      Mental Status: He is alert and oriented to person, place, and time. Mental status is at baseline.   Psychiatric:         Mood and Affect: Mood normal.         Behavior: Behavior normal.         Thought Content: Thought content normal.         Judgment: Judgment normal.         Assessment/Plan #1 tramadol 50 mg with two Tylenol p.o. 3 times daily, omeprazole  milligrams daily. #2 labs #3 chest x-ray with L-spine  Problems Addressed this Visit        Musculoskeletal and Injuries    Low back pain    Relevant Orders    XR Spine Lumbar 2 or 3 View    Ambulatory Referral to Physical Therapy Evaluate and treat, Ortho      Other Visit Diagnoses     Physical exam, annual    -  Primary    Epigastric pain        Relevant Orders    CBC (No Diff)    Comprehensive Metabolic Panel    Lipid Panel    Vitamin D 25 Hydroxy    Chest wall pain        Relevant Orders    XR Chest 2 View    Ambulatory Referral to Physical Therapy Evaluate and treat, Ortho      Diagnoses     Diagnosis Codes Comments    Physical exam, annual    -  Primary ICD-10-CM: Z00.00  ICD-9-CM: V70.0     Epigastric pain     ICD-10-CM: R10.13  ICD-9-CM: 789.06     Chest wall pain     ICD-10-CM: R07.89  ICD-9-CM: 786.52     Chronic low back pain with sciatica, sciatica laterality unspecified, unspecified back pain laterality     ICD-10-CM: M54.40, G89.29  ICD-9-CM: 724.2, 724.3, 338.29

## 2022-01-21 ENCOUNTER — HOSPITAL ENCOUNTER (OUTPATIENT)
Dept: GENERAL RADIOLOGY | Facility: HOSPITAL | Age: 27
Discharge: HOME | End: 2022-01-21

## 2022-01-21 LAB
HSV2 IGG SER IA-ACNC: <0.91 INDEX (ref 0–0.9)
RPR SER QL: NORMAL

## 2022-01-21 PROCEDURE — 72100 X-RAY EXAM L-S SPINE 2/3 VWS: CPT

## 2022-01-21 PROCEDURE — 71046 X-RAY EXAM CHEST 2 VIEWS: CPT

## 2022-01-21 RX ORDER — TRAMADOL HYDROCHLORIDE 50 MG/1
50 TABLET ORAL EVERY 8 HOURS PRN
Qty: 21 TABLET | Refills: 0 | Status: SHIPPED | OUTPATIENT
Start: 2022-01-21 | End: 2022-03-02 | Stop reason: SDUPTHER

## 2022-01-22 LAB
C TRACH RRNA SPEC QL NAA+PROBE: NEGATIVE
N GONORRHOEA RRNA SPEC QL NAA+PROBE: NEGATIVE

## 2022-02-02 ENCOUNTER — TREATMENT (OUTPATIENT)
Dept: PHYSICAL THERAPY | Facility: CLINIC | Age: 27
End: 2022-02-02

## 2022-02-02 DIAGNOSIS — M54.50 LUMBAR PAIN: Primary | ICD-10-CM

## 2022-02-02 PROCEDURE — 97110 THERAPEUTIC EXERCISES: CPT | Performed by: PHYSICAL THERAPIST

## 2022-02-02 PROCEDURE — 97530 THERAPEUTIC ACTIVITIES: CPT | Performed by: PHYSICAL THERAPIST

## 2022-02-02 PROCEDURE — 97161 PT EVAL LOW COMPLEX 20 MIN: CPT | Performed by: PHYSICAL THERAPIST

## 2022-02-02 NOTE — PROGRESS NOTES
"Physical Therapy Initial Evaluation and Plan of Care    Patient: Jd Garcia   : 1995  Diagnosis/ICD-10 Code:  Lumbar pain [M54.50]  Referring practitioner: Cyrus Valle MD    Subjective Evaluation    History of Present Illness  Onset date: approximately 3 years ago.  Mechanism of injury: A few years ago patient reports he injured his back playing soccer recreationally.  He suffered low-grade chronic LBP which would interfere with activity tolerance for longer periods of time.  He began working in a [employer] which requires a lot of repetitive lifting which has worsened his LBP.  He reports at times he will have to lift 50-70# at times.  He reports his biggest problem is lifting weights for a long period of time, and he struggles to perform this task after 3-4 hours.      Patient presented to Dr. Valle who prescribed patient cyclobenzaprine and tramadol.  Patient has difficulty tolerating the muscle relaxer and does not feel the tramadol is very helpful.  He also reports some (R) sternal/possible epigastric pain which is being investigated by Dr. Valle with subsequent testing.    Patient denies radicular symptoms or paresthesia LE.       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  At best pain ratin  At worst pain ratin  Location: (B) lumbosacral region  Quality: pressure (\"like my back is \")  Relieving factors: rest  Aggravating factors: lifting and repetitive movement (repetitive lifting, prolonged walking or running)    Diagnostic Tests  X-ray: normal    Patient Goals  Patient goals for therapy: decreased pain, increased strength and return to sport/leisure activities             Subjective Questionnaire: LEFS: 57/80    Objective          Palpation     Right   Hypertonic in the erector " spinae, lumbar paraspinals and quadratus lumborum.     Tenderness     Lumbar Spine  Tenderness in the spinous process.     Left Hip   Tenderness in the PSIS.     Right Hip   Tenderness in the PSIS.     Additional Tenderness Details  Min/mod (+) TTP (R) lumbar paraspinals, (-) TTP (L) lumbar paraspinals    Neurological Testing     Sensation     Lumbar   Left   Intact: light touch    Right   Intact: light touch    Additional Neurological Details  Patient verbalizes decreased sensation of light touch (R) L3 and S2 dermatomes    Active Range of Motion     Additional Active Range of Motion Details  Lumbar AROM (%):  Flexion 75%, lumbar and (B) hamstrings pain  Extension 25% with pain  Right sidebend 25% with pain  Left sidebend 25% with pain  Right rotation 50%, chest pain only  Left rotation 50%, chest and LBP    Strength/Myotome Testing     Left Hip   Planes of Motion   Flexion: 4+    Right Hip   Planes of Motion   Flexion: 3+ (with pain)    Left Knee   Flexion: 5  Extension: 5    Right Knee   Flexion: 4-  Extension: 4-    Left Ankle/Foot   Dorsiflexion: 5    Right Ankle/Foot   Dorsiflexion: 4          Assessment & Plan     Assessment  Impairments: abnormal muscle tone, abnormal or restricted ROM, activity intolerance, impaired physical strength, lacks appropriate home exercise program and pain with function  Functional Limitations: carrying objects, lifting, pulling, pushing, uncomfortable because of pain and unable to perform repetitive tasks  Assessment details: Patient is a 27 y.o male who reports chronic LBP which began approximately 3 years ago but has worsened due to the physical and 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  He rates symptoms 0-8/10, worst with prolonged activities such as lifting, walking or running.  He denies radicular symptoms or paresthesia.  He exhibits tenderness, decreased and painful lumbar AROM, decreased (R) LE strength, painful and slowed transitional movements such as  sit to stand, and poor tolerance to prolonged activities.  His Oswestry is 22% indicating a moderate perceived level of physical limitation.  He will benefit from skilled PT services to address these deficits, optimize functional recovery, and facilitate successful and painfree return to regular work tasks.  Prognosis: good    Goals  Plan Goals: STGs: to be met in 4 weeks  1. Patient will be independent with initial HEP  2. Patient will report improved lumbar symptoms 0-5/10 for improved tolerance to ADLs and ambulation  3. Patient will demonstrate improved lumbar AROM >/= 75% all planes for improved trunk mobility  4. Patient will demonstrate fair core stability during basic dynamic activities for evidence of appropriate stabilization    LTGs: to be met in 8 weeks  1. Patient will be independent with progressed strength and stabilization HEP  2. Patient will report improved lumbar symptoms 0-2/10 for improved tolerance to prolonged lifting and walking activities  3. Patient will demonstrate improved (R) LE strength by >/= 1 MMT grade for evidence of improved functional strength  4. Patient will consistently demonstrate good body mechanics with simulated lifting tasks to facilitate successful return to repetitive lifting  5. Patient will tolerate walking 1+ mile for improved tolerance to community ambulation  6. Patient will have improved Oswestry score by 10% and LEFS by 9 points for subjective evidence of functional improvement    Plan  Therapy options: will be seen for skilled therapy services  Planned modality interventions: cryotherapy, thermotherapy (hydrocollator packs) and electrical stimulation/Russian stimulation  Planned therapy interventions: abdominal trunk stabilization, body mechanics training, flexibility, functional ROM exercises, home exercise program, joint mobilization, manual therapy, neuromuscular re-education, strengthening, stretching, therapeutic activities, spinal/joint mobilization and  soft tissue mobilization  Frequency: 2x week  Duration in weeks: 8  Treatment plan discussed with: patient        Manual Therapy:         mins  01861;  Therapeutic Exercise:    16     mins  35433;     Neuromuscular Emerita:        mins  76106;    Therapeutic Activity:     11     mins  52108;     Gait Training:           mins  47794;     Ultrasound:          mins  12246;    Electrical Stimulation:         mins  56229 ( );  Dry Needling          mins self-pay    Timed Treatment:   27   mins   Total Treatment:     56   mins    PT SIGNATURE: Nahomi Arellano PT, DPT, OCS  [place] License #[id number]     DATE TREATMENT INITIATED: 2/2/2022    Initial Certification  Certification Period: 2/2/2022 thru 5/2/2022  I certify that the therapy services are furnished while this patient is under my care.  The services outlined above are required by this patient, and will be reviewed every 90 days.     PHYSICIAN: Cyrus Valle MD  [phone]                                          DATE:     Please sign and return via fax to [phone]. Thank you, [facility].

## 2022-02-10 ENCOUNTER — TREATMENT (OUTPATIENT)
Dept: PHYSICAL THERAPY | Facility: CLINIC | Age: 27
End: 2022-02-10

## 2022-02-10 DIAGNOSIS — M54.50 LUMBAR PAIN: Primary | ICD-10-CM

## 2022-02-10 PROCEDURE — 97110 THERAPEUTIC EXERCISES: CPT | Performed by: PHYSICAL THERAPIST

## 2022-02-10 NOTE — PROGRESS NOTES
"Physical Therapy Daily Progress Note      Visit # 2      Subjective Evaluation    History of Present Illness    Subjective comment: Pt reports that his lumbar pain is \"not that bad\".  States that his pain is dependent on what he doing.       Objective   See Exercise, Manual, and Modality Logs for complete treatment.   Added gastroc stretch,  swiss ball roll outs, and shlder ext    Assessment/Plan            Pt tolerated treatment with c/o pain on all exercises.  Pt had more lumbar pain on (R) side more than (L).  During swiss ball roll out pt c/o dizziness.  The exercise was stopped at that point. Consider standing table top roll out next treatment.  Pt also c/o pain in \"stomach\" as treatment progressed.        Manual Therapy:    0     mins  48440;  Therapeutic Exercise:    45     mins  09893;     Neuromuscular Emerita:    0    mins  67771;    Therapeutic Activity:     0     mins  15497;     Gait Trainin     mins  37682;     Ultrasound:     0     mins  44447;    Work Hardening           0      mins 34555  Iontophoresis               0   mins 44623  E-Stim                          _0_ mins 46853 ( )    Timed Treatment:   45   mins   Total Treatment:     45   mins    Domenico Simpson PTA  Physical Therapist Assistant  "

## 2022-03-02 ENCOUNTER — OFFICE VISIT (OUTPATIENT)
Dept: FAMILY MEDICINE CLINIC | Facility: CLINIC | Age: 27
End: 2022-03-02

## 2022-03-02 VITALS
HEART RATE: 92 BPM | OXYGEN SATURATION: 97 % | BODY MASS INDEX: 22.93 KG/M2 | HEIGHT: 75 IN | TEMPERATURE: 98.4 F | DIASTOLIC BLOOD PRESSURE: 76 MMHG | WEIGHT: 184.4 LBS | SYSTOLIC BLOOD PRESSURE: 118 MMHG

## 2022-03-02 DIAGNOSIS — R10.11 RUQ PAIN: ICD-10-CM

## 2022-03-02 DIAGNOSIS — M54.40 LOW BACK PAIN WITH SCIATICA, SCIATICA LATERALITY UNSPECIFIED, UNSPECIFIED BACK PAIN LATERALITY, UNSPECIFIED CHRONICITY: Primary | ICD-10-CM

## 2022-03-02 DIAGNOSIS — R19.7 DIARRHEA, UNSPECIFIED TYPE: ICD-10-CM

## 2022-03-02 PROCEDURE — 99213 OFFICE O/P EST LOW 20 MIN: CPT | Performed by: INTERNAL MEDICINE

## 2022-03-02 NOTE — PROGRESS NOTES
Subjective   Jd Garcia is a 27 y.o. male.     Vitals:    03/02/22 1555   BP: 118/76   Pulse: 92   Temp: 98.4 °F (36.9 °C)   SpO2: 97%      Body mass index is 23.05 kg/m².     History of Present Illness   Patient was seen for low back pain.  Patient does heavy lifting at his work.  Patient was informed he probably can no longer do this.  Patient was given a work note not to lift push pull or stand for more than 5 to 10 minutes.  Patient also has developed diarrhea patient had stool work-up ordered and was put on Pepto-Bismol.  Patient also developed right upper quadrant pain.  Patient had ultrasound ordered and is pending at the time of dictation.  Patient also has labs for stool work-up and amylase levels.  Patient's liver function test is also being checked.  Patient will follow up after testing.    Dictated utilizing Dragon dictation. If there are questions or for further clarification, please contact me.  The following portions of the patient's history were reviewed and updated as appropriate: allergies, current medications, past family history, past medical history, past social history, past surgical history and problem list.    Review of Systems   Constitutional: Negative for fatigue and fever.   HENT: Positive for congestion. Negative for trouble swallowing.    Eyes: Negative for discharge and visual disturbance.   Respiratory: Negative for choking and shortness of breath.    Cardiovascular: Negative for chest pain and palpitations.   Gastrointestinal: Positive for abdominal pain and diarrhea. Negative for blood in stool.   Endocrine: Negative.    Genitourinary: Negative for genital sores and hematuria.   Musculoskeletal: Positive for back pain. Negative for gait problem and joint swelling.   Skin: Negative for color change, pallor, rash and wound.   Allergic/Immunologic: Positive for environmental allergies. Negative for immunocompromised state.   Neurological: Negative for facial asymmetry and speech  difficulty.   Psychiatric/Behavioral: Negative for hallucinations and suicidal ideas.       Objective   Physical Exam  Vitals and nursing note reviewed.   Constitutional:       Appearance: Normal appearance. He is well-developed.   HENT:      Head: Normocephalic and atraumatic.      Nose: Nose normal.      Mouth/Throat:      Mouth: Mucous membranes are moist.      Pharynx: Oropharynx is clear.   Eyes:      Extraocular Movements: Extraocular movements intact.      Conjunctiva/sclera: Conjunctivae normal.      Pupils: Pupils are equal, round, and reactive to light.   Cardiovascular:      Rate and Rhythm: Normal rate and regular rhythm.      Heart sounds: Normal heart sounds. No murmur heard.  No friction rub. No gallop.    Pulmonary:      Effort: Pulmonary effort is normal. No respiratory distress.      Breath sounds: Normal breath sounds. No stridor. No wheezing, rhonchi or rales.   Chest:      Chest wall: No tenderness.   Abdominal:      General: Bowel sounds are normal. There is no distension.      Palpations: Abdomen is soft. There is no mass.      Tenderness: There is abdominal tenderness. There is no right CVA tenderness, left CVA tenderness, guarding or rebound.      Hernia: No hernia is present.   Musculoskeletal:         General: Tenderness present. Normal range of motion.      Cervical back: Normal range of motion and neck supple.   Skin:     General: Skin is warm and dry.   Neurological:      General: No focal deficit present.      Mental Status: He is alert and oriented to person, place, and time. Mental status is at baseline.   Psychiatric:         Mood and Affect: Mood normal.         Behavior: Behavior normal.         Thought Content: Thought content normal.         Judgment: Judgment normal.         Assessment/Plan #1 ultrasound right upper quadrant #2 labs #3 no lifting etc.  Problems Addressed this Visit        Musculoskeletal and Injuries    Low back pain - Primary      Other Visit Diagnoses      Diarrhea, unspecified type        Relevant Orders    Ova & Parasite Examination - Stool, Per Rectum    Stool Culture (Reference Lab) - Stool, Per Rectum    Fecal Leukocytes - Stool, Per Rectum    Amylase    Comprehensive Metabolic Panel    RUQ pain        Relevant Orders    US Abdomen Complete      Diagnoses     Diagnosis Codes Comments    Low back pain with sciatica, sciatica laterality unspecified, unspecified back pain laterality, unspecified chronicity    -  Primary ICD-10-CM: M54.40  ICD-9-CM: 724.3     Diarrhea, unspecified type     ICD-10-CM: R19.7  ICD-9-CM: 787.91     RUQ pain     ICD-10-CM: R10.11  ICD-9-CM: 789.01

## 2022-03-02 NOTE — PATIENT INSTRUCTIONS
Cholélithiase  Cholelithiasis    La cholélithiase est une maladie caractérisée par la formation de calculs biliaires dans la vésicule biliaire. La vésicule biliaire est un organe leland stocke la bile. La bile est un liquide leland aide à la digestion jose graisses. Les calculs biliaires débutent sous forme de petits cristaux leland peuvent peu à peu évoluer en calculs. Ils peuvent ne causer aucun symptôme jusqu’à ce qu’ils bloquent le conduit de la vésicule biliaire, ou conduit cystique, lorsque la vésicule biliaire se resserre (se contracte) après l’ingestion de nourriture. Bruna peut provoquer jose douleurs, connues sous le nom de crise de vésicule biliaire ou de colique biliaire.  Il existe deux types principaux de calculs biliaires :  · Les calculs de cholestérol. Ceux-ci représentent le type de calculs biliaires le plus courant. Thomas calculs sont constitués de cholestérol durci et sont généralement jaune-vert. Le cholestérol est une substance produite par le foie leland ressemble à de la graisse.  · Les calculs pigmentaires. Ceux-ci sont de couleur foncée et sont constitués d’une substance jaune-rouge, appelée bilirubine,leland se forme lorsque l’hémoglobine jose globules rouges se décompose.  Quelles sont les causes ?  Cette affection peut être causée par un déséquilibre jose différents éléments leland composent la bile. Bruna peut se produire si la bile :  · Contient trop de bilirubine. Bruna peut se produire lors de certaines maladies du sang, comme l’anémie drépanocytaire.  · Contient trop de cholestérol.  · Ne contient pas assez de sels biliaires. Thomas sels aident l’organisme à absorber et à digérer les graisses.  Dans certains andi, cette affection est due au fait que la vésicule biliaire ne se vide pas complètement ou pas assez souvent. Bruna se produit fréquemment pendant la grossesse.  Quels sont les facteurs leland augmentent le risque ?  Les facteurs suivants peuvent augmenter le risque de développer cette affection :  · Être une  femme.  · Avoir eu jignesh grossesses multiples. Les prestataires de soins de santé conseillent parfois de retirer la vésicule biliaire malade priscilla toute grossesse ulSelect Medical Special[facility] Hospital - Youngstownieure.  · Avoir une alimentation bakari en fritures, graisses et glucides raffinés, comme le pain et le chris blancs.  · Être obèse.  · Avoir plus de 40 ans.  · Utiliser depuis longtemps jignesh médicaments contenant jignesh hormones féminines (œstrogènes).  · Perdre du poids rapidement.  · Avoir jignesh antécédents familiaux de calculs biliaires.  · Souffrir de certains problèmes de santé, tels que :  ? Le diabète sucré.  ? La mucoviscidose.  ? La maladie de [place].  ? Une cirrhose ou une autre maladie hépatique à long terme (chronique).  ? Certaines maladies du sang, comme l’anémie drépanocytaire ou une leucémie.  Quels sont les signes ou symptômes ?  Dans de nombreux andi, la présence de calculs biliaires ne provoque aucun symptôme. Lorsque vous avez jignesh calculs biliaires sans présenter de symptômes, on parle de calculs biliaires silencieux. Si un calcul biliaire obstrue votre conduit cholédoque, dasha peut provoquer une crise de vésicule biliaire. Le principal symptôme d’une crise de vésicule biliaire est une douleur soudaine dans la partie supérieure droite de l’abdomen. La douleur :  · Apparaît en général le soir ou après avoir mangé.  · Peut durer une heure ou davantage.  · Peut se propager à l’épaule droite, au dos ou à la poitrine.  · Peut ressembler à une indigestion. Il s’agit d’une gêne, d’une sensation de brûlure ou de trop-plein dans la partie supérieure de l’abdomen.  Si le conduit cholédoque est obstrué pendant plus de quelques heures, dasha peut provoquer une infection ou une inflammation de la vésicule biliaire (cholécystite), du foie ou du pancréas. Ce leland peut engendrer :  · Jignesh nausées ou vomissements.  · Jignesh ballonnements.  · Une douleur à l’abdomen brunilda 5 heures ou davantage.  · Une sensibilité dans la partie supérieure de l’abdomen, souvent dans  la partie supérieure droite, sous la cage thoracique.  · Une fièvre ou jignesh frissons.  · La peau ou shan jignesh yeux leland deviennent jaunes (jaunisse). En général, bruna se produit lorsqu’un calcul obstrue le conduit cholédoque et empêche le passage de la bile.  · Une urine foncée ou jignesh selles claires.  Comment se fait le diagnostic ?  Le diagnostic de cette affection peut reposer kwame :  · Un examen clinique.  · Aguila antécédents médicaux.  · Une échographie.  · Une tomodensitométrie.  · Une IRM.  Vous pourrez également devoir effectuer d’autres examens, tels que :  · Jignesh analyses de sang pour vérifier la présence de signes d’infection ou d’inflammation.  · Une choléscintigraphie, ou scintigraphie hépatobiliaire. Il s’agit d’un examen d’imagerie de la vésicule biliaire et jignesh canaux biliaires (système biliaire) à l’aide de matériel radioactif inoffensif et de caméras spéciales capables de voir le matériel radioactif.  · Une cholangiopancréatographie rétrograde endoscopique. Cet examen consiste à introduire un petit tube clemencia d’une caméra à son extrémité (endoscope) par la bouche pour inspecter les canaux biliaires et vérifier la présence de blocages.  Comment cette affection est-madhuri traitée ?  Le traitement de cette affection dépend de sa gravité. Les calculs biliaires silencieux ne nécessitent aucun traitement. Un traitement pourra s’avérer nécessaire s’il existe un blocage causant une crise de vésicule biliaire ou d’autres symptômes. Le traitement pourra consister à :  · Jignesh soins à domicile, si les symptômes ne sont pas graves.  ? Au cours d’une simple crise de vésicule biliaire, cessez de manger et de boire (sauf de l’eau et jignesh liquides clairs) pendant 12 à 24 heures. Bruna permettra de « calmer » votre vésicule biliaire. Après 1 jour ou 2, vous pourrez commencer à manger jignesh aliments simples ou clairs, comme jignesh bouillons et jignesh biscuits salés.  ? Vous aurez peut-être aussi besoin de prendre jignesh médicaments pour  soulager la douleur ou les nausées, voire les deux.  ? Si vous avez une cholécystite et une infection, vous devrez prendre jignesh antibiotiques.  · Une hospitalisation, si bruna s’avère nécessaire pour contrôler la douleur ou en andi de cholécystite avec une infection grave.  · Une cholécystectomie, ou intervention chirurgicale pour retirer la vésicule biliaire. Il s’agit du traitement le plus fréquent lorsque tous les autres traitements ont échoué.  · Jignesh médicaments pour fragmenter les calculs biliaires. Ce sont les médicaments leland sont les plus efficaces pour traiter les petits calculs biliaires. Les médicaments pourront être utilisés pendant 6 à 12 mois tout au plus.  · Une cholangiopancréatographie rétrograde endoscopique. Un petit panier peut être fixé à l’endoscope et utilisé pour attraper et retirer les calculs biliaires, en particulier ceux leland se trouvent dans le conduit cholédoque.  Suivez les instructions suivantes à domicile :  Médicaments  · Prenez nawaf médicaments en vente erick et kwame ordonnance en suivant scrupuleusement les instructions de votre prestataire de soins de santé.  · Si un antibiotique vous a été prescrit, prenez-le en suivant les recommandations de votre prestataire de soins de santé. N’arrêtez pas de prendre l’antibiotique même si vous commencez à vous sentir mieux.  · Demandez à votre prestataire de soins de santé si vous pouvez conduire ou utiliser jignesh machines lorsque vous prenez le médicament qu’il vous a prescrit.  Alimentation et boissons  · Buvez jignesh quantités suffisantes de liquides pour garder votre urine jaune pâle. Bruna est important pendant une crise de vésicule biliaire. Buvez de préférence de l’eau et jignesh liquides clairs.  · Suivez un régime alimentaire jason. Par exemple :  ? Réduisez votre consommation d’aliments gras, comme les aliments frits et ceux riches en cholestérol.  ? Réduisez votre consommation de glucides raffinés comme le pain et le chris blancs.  ? Consommez  davantage de fibres. Choisissez ojse aliments comme les amandes, les fruits et les haricots.  Consommation d’alcool  · Si vous consommez de l’alcool :  ? Limitez votre consommation à :  § 1 verre par jour pour les femmes leland ne sont pas enceintes.  § 2 verres par jour pour les hommes.  ? Ayez conscience de la quantité d’alcool contenue dans votre verre. [place] un verre correspond à une bouteille de bière (355 ml [12 onces]), à un verre de ethel (148 ml [5 onces]) ou à un verre à liqueur d’alcool fort (44 ml [1,5 once]).  Instructions générales  · N’utilisez pas de produits contenant du tabac ou de la nicotine, tels que les cigarettes, les cigarettes électroniques et le tabac à mâcher. Si vous avez besoin d’aide pour arrêter de fumer, demandez conseil à votre prestataire de soins de santé.  · Maintenez un poids jason.  · Rendez-vous à toutes nawaf visites de suivi prévues par votre prestataire de soins de santé. Elles pourront inclure jose consultations avec un chirurgien ou un spécialiste. C’est important.  Pour plus d’informations  · National [place] of Diabetes and Digestive and Kidney Diseases (Institut national du diabète et jose maladies rénales et de l’appareil digestif) : www.niddk.nih.gov  Prenez contact avec un prestataire de soins de santé si :  · Vous pensez avoir présenté une crise de vésicule biliaire.  · Vous avez reçu un diagnostic de calculs biliaires silencieux et si vous commencez à ressentir une douleur à l’abdomen ou à avoir jose indigestions.  · Nawaf crises commencent à être plus fréquentes.  · Votre urine est foncée et nawaf selles deviennent plus claires.  Demandez immédiatement de l’aide si :  · Vous présentez jose douleurs suite à une crise de vésicule biliaire, lesquelles durent plus de 2 heures.  · Vous ressentez une douleur à l’abdomen leland dure plus de 5 heures ou leland s’intensifie.  · Vous avez de la fièvre ou joes frissons.  · Vous avez jose nausées et jose vomissements leland ne disparaissent  pas.  · Vous développez une jaunisse.  Résumé  · La cholélithiase est une maladie caractérisée par la formation de calculs biliaires dans la vésicule biliaire.  · Cette affection peut être causée par un déséquilibre jose différents éléments leland composent la bile. Madhuri peut survenir si la bile contient trop de bilirubine ou de cholestérol, ou si madhuri manque de sels biliaires.  · Le traitement jose calculs biliaires dépend de la gravité de l’affection. Les calculs biliaires silencieux ne nécessitent aucun traitement.  · Si les calculs biliaires provoquent une crise de vésicule biliaire ou d’autres symptômes, le traitement consiste généralement à cesser de manger et de boire. Le traitement peut également comprendre la prise de médicaments contre la douleur, d’antibiotiques et, parfois, une hospitalisation.  · Une intervention chirurgicale destinée à retirer la vésicule biliaire est fréquemment pratiquée si tous les autres traitements ont échoué.  Thomas conseils et renseignements ne sauraient se substituer à l’donald médical de votre prestataire de soins de santé. Par conséquent, il est primordial de parler de toutes nawaf préoccupations avec votre prestataire de soins de santé.  Document Revised: 01/20/2021 Document Reviewed: 01/20/2021  Elsevier Patient Education © 2021 Elsevier Inc.

## 2022-03-03 ENCOUNTER — LAB (OUTPATIENT)
Dept: FAMILY MEDICINE CLINIC | Facility: CLINIC | Age: 27
End: 2022-03-03

## 2022-03-03 LAB
ALBUMIN SERPL-MCNC: 4.7 G/DL (ref 3.5–5.2)
ALBUMIN/GLOB SERPL: 1.9 G/DL
ALP SERPL-CCNC: 53 U/L (ref 39–117)
ALT SERPL W P-5'-P-CCNC: 18 U/L (ref 1–41)
AMYLASE SERPL-CCNC: 69 U/L (ref 28–100)
ANION GAP SERPL CALCULATED.3IONS-SCNC: 8 MMOL/L (ref 5–15)
AST SERPL-CCNC: 13 U/L (ref 1–40)
BILIRUB SERPL-MCNC: 0.2 MG/DL (ref 0–1.2)
BUN SERPL-MCNC: 7 MG/DL (ref 6–20)
BUN/CREAT SERPL: 6.4 (ref 7–25)
CALCIUM SPEC-SCNC: 9.9 MG/DL (ref 8.6–10.5)
CHLORIDE SERPL-SCNC: 103 MMOL/L (ref 98–107)
CO2 SERPL-SCNC: 30 MMOL/L (ref 22–29)
CREAT SERPL-MCNC: 1.1 MG/DL (ref 0.76–1.27)
EGFRCR SERPLBLD CKD-EPI 2021: 95.5 ML/MIN/1.73
GLOBULIN UR ELPH-MCNC: 2.5 GM/DL
GLUCOSE SERPL-MCNC: 98 MG/DL (ref 65–99)
POTASSIUM SERPL-SCNC: 4.6 MMOL/L (ref 3.5–5.2)
PROT SERPL-MCNC: 7.2 G/DL (ref 6–8.5)
SODIUM SERPL-SCNC: 141 MMOL/L (ref 136–145)

## 2022-03-03 PROCEDURE — 80053 COMPREHEN METABOLIC PANEL: CPT | Performed by: INTERNAL MEDICINE

## 2022-03-03 PROCEDURE — 82150 ASSAY OF AMYLASE: CPT | Performed by: INTERNAL MEDICINE

## 2022-03-03 PROCEDURE — 36415 COLL VENOUS BLD VENIPUNCTURE: CPT | Performed by: INTERNAL MEDICINE

## 2022-03-04 ENCOUNTER — LAB (OUTPATIENT)
Dept: FAMILY MEDICINE CLINIC | Facility: CLINIC | Age: 27
End: 2022-03-04

## 2022-03-04 DIAGNOSIS — R19.7 DIARRHEA, UNSPECIFIED TYPE: ICD-10-CM

## 2022-03-08 LAB
BACTERIA SPEC CULT: NORMAL
BACTERIA SPEC CULT: NORMAL
CAMPYLOBACTER STL CULT: NORMAL
E COLI SXT STL QL IA: NEGATIVE
SALM + SHIG STL CULT: NORMAL

## 2022-03-11 LAB
O+P SPEC MICRO: NORMAL
O+P STL CONC: NORMAL
WBC STL QL MICRO: NORMAL
WBC STL QL MICRO: NORMAL

## 2022-03-17 ENCOUNTER — HOSPITAL ENCOUNTER (OUTPATIENT)
Dept: MRI IMAGING | Facility: HOSPITAL | Age: 27
End: 2022-03-17

## 2022-03-17 ENCOUNTER — APPOINTMENT (OUTPATIENT)
Dept: ULTRASOUND IMAGING | Facility: HOSPITAL | Age: 27
End: 2022-03-17

## 2022-03-26 ENCOUNTER — HOSPITAL ENCOUNTER (OUTPATIENT)
Dept: MRI IMAGING | Facility: HOSPITAL | Age: 27
Discharge: HOME | End: 2022-03-26
Admitting: INTERNAL MEDICINE

## 2022-03-26 DIAGNOSIS — M54.40 LOW BACK PAIN WITH SCIATICA, SCIATICA LATERALITY UNSPECIFIED, UNSPECIFIED BACK PAIN LATERALITY, UNSPECIFIED CHRONICITY: ICD-10-CM

## 2022-03-26 PROCEDURE — 72148 MRI LUMBAR SPINE W/O DYE: CPT

## 2022-03-30 DIAGNOSIS — R10.9 ABDOMINAL PAIN, UNSPECIFIED ABDOMINAL LOCATION: ICD-10-CM

## 2022-03-30 DIAGNOSIS — R19.7 DIARRHEA, UNSPECIFIED TYPE: Primary | ICD-10-CM

## 2022-05-09 ENCOUNTER — OFFICE VISIT (OUTPATIENT)
Dept: GASTROENTEROLOGY | Facility: CLINIC | Age: 27
End: 2022-05-09

## 2022-05-09 VITALS
TEMPERATURE: 97.8 F | WEIGHT: 186.3 LBS | HEIGHT: 75 IN | BODY MASS INDEX: 23.16 KG/M2 | SYSTOLIC BLOOD PRESSURE: 131 MMHG | DIASTOLIC BLOOD PRESSURE: 74 MMHG

## 2022-05-09 DIAGNOSIS — R10.13 EPIGASTRIC PAIN: Primary | ICD-10-CM

## 2022-05-09 PROCEDURE — 99204 OFFICE O/P NEW MOD 45 MIN: CPT | Performed by: INTERNAL MEDICINE

## 2022-05-09 RX ORDER — PANTOPRAZOLE SODIUM 40 MG/1
40 TABLET, DELAYED RELEASE ORAL DAILY
Qty: 30 TABLET | Refills: 5 | Status: SHIPPED | OUTPATIENT
Start: 2022-05-09

## 2022-05-09 NOTE — PROGRESS NOTES
Chief Complaint   Patient presents with   • Abdominal Pain     Subjective   HPI  Jd Garcia is a 27 y.o. male who presents today for new patient evaluation.  He complains of upper abdominal pain and fullness.  Symptoms have been present off and on for few years.  He states that he was previously treated for similar issue back in [place] about 4 years ago he was given a medicine for his stomach but does not know what it was.  He took omeprazole earlier this year with some success but has not been on that now for a couple of months.  Does report that he has issues with frequent binge drinking usually hard liquor.  He does not recall ever having prior upper endoscopy.      Objective   Vitals:    05/09/22 1516   BP: 131/74   Temp: 97.8 °F (36.6 °C)     Physical Exam  Vitals reviewed.   Constitutional:       Appearance: He is well-developed.   HENT:      Head: Normocephalic and atraumatic.   Abdominal:      General: Bowel sounds are normal. There is no distension.      Palpations: Abdomen is soft. There is no mass.      Tenderness: There is no abdominal tenderness.      Hernia: No hernia is present.   Skin:     General: Skin is warm and dry.   Neurological:      Mental Status: He is alert and oriented to person, place, and time.   Psychiatric:         Behavior: Behavior normal.         Thought Content: Thought content normal.         Judgment: Judgment normal.              Assessment/Plan   Assessment:     1. Epigastric pain    2.      Dyspepsia    Plan:   H pylori breath test today  Start protonix 40mg  Recommend significant reduction in EtOH intake    Office f/u in 6 weeks          Salty Campoverde M.D.  [facility]  [address] [place] [address]  Office: [phone]

## 2022-05-10 LAB — UREA BREATH TEST QL: POSITIVE

## 2022-05-12 ENCOUNTER — TELEPHONE (OUTPATIENT)
Dept: GASTROENTEROLOGY | Facility: CLINIC | Age: 27
End: 2022-05-12

## 2022-05-12 NOTE — TELEPHONE ENCOUNTER
----- Message from Salty Campoverde MD sent at 5/12/2022  1:02 PM EDT -----  Positive  Recommend prevpac with f/u HPBT per protocol  Office f/u in 8 weeks

## 2022-05-13 RX ORDER — CLARITHROMYCIN 500 MG/1
500 TABLET, COATED ORAL 2 TIMES DAILY
Qty: 28 TABLET | Refills: 0 | Status: SHIPPED | OUTPATIENT
Start: 2022-05-13 | End: 2022-05-31 | Stop reason: HOSPADM

## 2022-05-13 RX ORDER — METRONIDAZOLE 500 MG/1
500 TABLET ORAL 2 TIMES DAILY
Qty: 28 TABLET | Refills: 0 | Status: SHIPPED | OUTPATIENT
Start: 2022-05-13 | End: 2022-05-31 | Stop reason: HOSPADM

## 2022-05-13 RX ORDER — LANSOPRAZOLE 30 MG/1
30 CAPSULE, DELAYED RELEASE ORAL 2 TIMES DAILY
Qty: 60 CAPSULE | Refills: 5 | Status: SHIPPED | OUTPATIENT
Start: 2022-05-13

## 2022-05-13 RX ORDER — AMOXICILLIN 500 MG/1
1000 CAPSULE ORAL 2 TIMES DAILY
Qty: 56 CAPSULE | Refills: 0 | Status: SHIPPED | OUTPATIENT
Start: 2022-05-13 | End: 2022-05-31 | Stop reason: HOSPADM

## 2022-05-13 NOTE — TELEPHONE ENCOUNTER
Patient notified of results and recommendations and verbalized understanding  [facility] already scheduled  Pt counseled  on holding pantoprazole during the 14 days he is taking prevacid. No ETOH  while on flagyl. And no PPI 14 days prior to apt incase HPBT ordered at fu

## 2022-05-19 ENCOUNTER — TELEPHONE (OUTPATIENT)
Dept: GASTROENTEROLOGY | Facility: CLINIC | Age: 27
End: 2022-05-19

## 2022-05-31 ENCOUNTER — HOSPITAL ENCOUNTER (OUTPATIENT)
Facility: HOSPITAL | Age: 27
Setting detail: OBSERVATION
Discharge: HOME | End: 2022-05-31
Attending: EMERGENCY MEDICINE | Admitting: EMERGENCY MEDICINE

## 2022-05-31 ENCOUNTER — READMISSION MANAGEMENT (OUTPATIENT)
Dept: CALL CENTER | Facility: HOSPITAL | Age: 27
End: 2022-05-31

## 2022-05-31 ENCOUNTER — APPOINTMENT (OUTPATIENT)
Dept: CARDIOLOGY | Facility: HOSPITAL | Age: 27
End: 2022-05-31

## 2022-05-31 ENCOUNTER — APPOINTMENT (OUTPATIENT)
Dept: GENERAL RADIOLOGY | Facility: HOSPITAL | Age: 27
End: 2022-05-31

## 2022-05-31 VITALS
HEART RATE: 69 BPM | TEMPERATURE: 97.7 F | OXYGEN SATURATION: 98 % | BODY MASS INDEX: 23.13 KG/M2 | RESPIRATION RATE: 17 BRPM | HEIGHT: 75 IN | WEIGHT: 186 LBS | DIASTOLIC BLOOD PRESSURE: 77 MMHG | SYSTOLIC BLOOD PRESSURE: 123 MMHG

## 2022-05-31 DIAGNOSIS — R07.9 CHEST PAIN, UNSPECIFIED TYPE: Primary | ICD-10-CM

## 2022-05-31 DIAGNOSIS — R00.2 PALPITATIONS: ICD-10-CM

## 2022-05-31 LAB
ALBUMIN SERPL-MCNC: 4.2 G/DL (ref 3.5–5.2)
ALBUMIN/GLOB SERPL: 1.6 G/DL
ALP SERPL-CCNC: 54 U/L (ref 39–117)
ALT SERPL W P-5'-P-CCNC: 13 U/L (ref 1–41)
ANION GAP SERPL CALCULATED.3IONS-SCNC: 10 MMOL/L (ref 5–15)
ANION GAP SERPL CALCULATED.3IONS-SCNC: 11 MMOL/L (ref 5–15)
APTT PPP: 28.6 SECONDS (ref 61–76.5)
AST SERPL-CCNC: 18 U/L (ref 1–40)
BASOPHILS # BLD AUTO: 0 10*3/MM3 (ref 0–0.2)
BASOPHILS # BLD AUTO: 0 10*3/MM3 (ref 0–0.2)
BASOPHILS NFR BLD AUTO: 0.4 % (ref 0–1.5)
BASOPHILS NFR BLD AUTO: 0.7 % (ref 0–1.5)
BH CV ECHO MEAS - ACS: 2.37 CM
BH CV ECHO MEAS - AO MAX PG: 2.7 MMHG
BH CV ECHO MEAS - AO MEAN PG: 1.41 MMHG
BH CV ECHO MEAS - AO ROOT DIAM: 3.2 CM
BH CV ECHO MEAS - AO V2 MAX: 81.5 CM/SEC
BH CV ECHO MEAS - AO V2 VTI: 15.5 CM
BH CV ECHO MEAS - AVA(I,D): 2.7 CM2
BH CV ECHO MEAS - EDV(CUBED): 88.6 ML
BH CV ECHO MEAS - EDV(MOD-SP4): 86.1 ML
BH CV ECHO MEAS - EF(MOD-BP): 65 %
BH CV ECHO MEAS - EF(MOD-SP4): 65.2 %
BH CV ECHO MEAS - ESV(CUBED): 32.5 ML
BH CV ECHO MEAS - ESV(MOD-SP4): 30 ML
BH CV ECHO MEAS - FS: 28.4 %
BH CV ECHO MEAS - IVS/LVPW: 0.73 CM
BH CV ECHO MEAS - IVSD: 0.62 CM
BH CV ECHO MEAS - LA DIMENSION: 3.1 CM
BH CV ECHO MEAS - LV DIASTOLIC VOL/BSA (35-75): 40.5 CM2
BH CV ECHO MEAS - LV MASS(C)D: 101.2 GRAMS
BH CV ECHO MEAS - LV MAX PG: 2.06 MMHG
BH CV ECHO MEAS - LV MEAN PG: 0.98 MMHG
BH CV ECHO MEAS - LV SYSTOLIC VOL/BSA (12-30): 14.1 CM2
BH CV ECHO MEAS - LV V1 MAX: 71.7 CM/SEC
BH CV ECHO MEAS - LV V1 VTI: 13 CM
BH CV ECHO MEAS - LVIDD: 4.5 CM
BH CV ECHO MEAS - LVIDS: 3.2 CM
BH CV ECHO MEAS - LVOT AREA: 3.2 CM2
BH CV ECHO MEAS - LVOT DIAM: 2.03 CM
BH CV ECHO MEAS - LVPWD: 0.86 CM
BH CV ECHO MEAS - MV A MAX VEL: 50.4 CM/SEC
BH CV ECHO MEAS - MV DEC SLOPE: 381.7 CM/SEC2
BH CV ECHO MEAS - MV DEC TIME: 0.17 MSEC
BH CV ECHO MEAS - MV E MAX VEL: 65.2 CM/SEC
BH CV ECHO MEAS - MV E/A: 1.29
BH CV ECHO MEAS - MV MAX PG: 2.6 MMHG
BH CV ECHO MEAS - MV MEAN PG: 1.12 MMHG
BH CV ECHO MEAS - MV V2 VTI: 22.4 CM
BH CV ECHO MEAS - MVA(VTI): 1.89 CM2
BH CV ECHO MEAS - PA ACC TIME: 0.13 SEC
BH CV ECHO MEAS - PA PR(ACCEL): 18.6 MMHG
BH CV ECHO MEAS - PA V2 MAX: 99 CM/SEC
BH CV ECHO MEAS - PI END-D VEL: 81.3 CM/SEC
BH CV ECHO MEAS - PULM A REVS DUR: 0.11 SEC
BH CV ECHO MEAS - PULM A REVS VEL: 30.1 CM/SEC
BH CV ECHO MEAS - PULM DIAS VEL: 47 CM/SEC
BH CV ECHO MEAS - PULM S/D: 0.73
BH CV ECHO MEAS - PULM SYS VEL: 34.5 CM/SEC
BH CV ECHO MEAS - RAP SYSTOLE: 3 MMHG
BH CV ECHO MEAS - RV MAX PG: 2 MMHG
BH CV ECHO MEAS - RV V1 MAX: 70.7 CM/SEC
BH CV ECHO MEAS - RV V1 VTI: 16.8 CM
BH CV ECHO MEAS - RVDD: 2.7 CM
BH CV ECHO MEAS - RVSP: 21.1 MMHG
BH CV ECHO MEAS - SI(MOD-SP4): 26.4 ML/M2
BH CV ECHO MEAS - SV(LVOT): 42.3 ML
BH CV ECHO MEAS - SV(MOD-SP4): 56.2 ML
BH CV ECHO MEAS - TR MAX PG: 18.1 MMHG
BH CV ECHO MEAS - TR MAX VEL: 212.5 CM/SEC
BILIRUB SERPL-MCNC: 0.2 MG/DL (ref 0–1.2)
BUN SERPL-MCNC: 8 MG/DL (ref 6–20)
BUN SERPL-MCNC: 9 MG/DL (ref 6–20)
BUN/CREAT SERPL: 10.7 (ref 7–25)
BUN/CREAT SERPL: 8.8 (ref 7–25)
CALCIUM SPEC-SCNC: 9.2 MG/DL (ref 8.6–10.5)
CALCIUM SPEC-SCNC: 9.3 MG/DL (ref 8.6–10.5)
CHLORIDE SERPL-SCNC: 102 MMOL/L (ref 98–107)
CHLORIDE SERPL-SCNC: 103 MMOL/L (ref 98–107)
CHOLEST SERPL-MCNC: 159 MG/DL (ref 0–200)
CK SERPL-CCNC: 331 U/L (ref 20–200)
CO2 SERPL-SCNC: 23 MMOL/L (ref 22–29)
CO2 SERPL-SCNC: 26 MMOL/L (ref 22–29)
CREAT SERPL-MCNC: 0.84 MG/DL (ref 0.76–1.27)
CREAT SERPL-MCNC: 0.91 MG/DL (ref 0.76–1.27)
CRP SERPL-MCNC: <0.3 MG/DL (ref 0–0.5)
D DIMER PPP FEU-MCNC: 0.3 MG/L (FEU) (ref 0–0.59)
DEPRECATED RDW RBC AUTO: 42.4 FL (ref 37–54)
DEPRECATED RDW RBC AUTO: 43.8 FL (ref 37–54)
EGFRCR SERPLBLD CKD-EPI 2021: 120 ML/MIN/1.73
EGFRCR SERPLBLD CKD-EPI 2021: 124.1 ML/MIN/1.73
EOSINOPHIL # BLD AUTO: 0.1 10*3/MM3 (ref 0–0.4)
EOSINOPHIL # BLD AUTO: 0.1 10*3/MM3 (ref 0–0.4)
EOSINOPHIL NFR BLD AUTO: 2.7 % (ref 0.3–6.2)
EOSINOPHIL NFR BLD AUTO: 3.5 % (ref 0.3–6.2)
ERYTHROCYTE [DISTWIDTH] IN BLOOD BY AUTOMATED COUNT: 13.9 % (ref 12.3–15.4)
ERYTHROCYTE [DISTWIDTH] IN BLOOD BY AUTOMATED COUNT: 14 % (ref 12.3–15.4)
GLOBULIN UR ELPH-MCNC: 2.6 GM/DL
GLUCOSE SERPL-MCNC: 100 MG/DL (ref 65–99)
GLUCOSE SERPL-MCNC: 108 MG/DL (ref 65–99)
HCT VFR BLD AUTO: 46.1 % (ref 37.5–51)
HCT VFR BLD AUTO: 46.3 % (ref 37.5–51)
HGB BLD-MCNC: 14.9 G/DL (ref 13–17.7)
HGB BLD-MCNC: 15.1 G/DL (ref 13–17.7)
INR PPP: 1.06 (ref 0.93–1.1)
LIPASE SERPL-CCNC: 26 U/L (ref 13–60)
LYMPHOCYTES # BLD AUTO: 2 10*3/MM3 (ref 0.7–3.1)
LYMPHOCYTES # BLD AUTO: 2.3 10*3/MM3 (ref 0.7–3.1)
LYMPHOCYTES NFR BLD AUTO: 52.7 % (ref 19.6–45.3)
LYMPHOCYTES NFR BLD AUTO: 53.9 % (ref 19.6–45.3)
MAGNESIUM SERPL-MCNC: 1.8 MG/DL (ref 1.6–2.6)
MAXIMAL PREDICTED HEART RATE: 195 BPM
MCH RBC QN AUTO: 28.7 PG (ref 26.6–33)
MCH RBC QN AUTO: 29.2 PG (ref 26.6–33)
MCHC RBC AUTO-ENTMCNC: 32.2 G/DL (ref 31.5–35.7)
MCHC RBC AUTO-ENTMCNC: 32.7 G/DL (ref 31.5–35.7)
MCV RBC AUTO: 89.1 FL (ref 79–97)
MCV RBC AUTO: 89.2 FL (ref 79–97)
MONOCYTES # BLD AUTO: 0.3 10*3/MM3 (ref 0.1–0.9)
MONOCYTES # BLD AUTO: 0.4 10*3/MM3 (ref 0.1–0.9)
MONOCYTES NFR BLD AUTO: 9 % (ref 5–12)
MONOCYTES NFR BLD AUTO: 9.5 % (ref 5–12)
NEUTROPHILS NFR BLD AUTO: 1.2 10*3/MM3 (ref 1.7–7)
NEUTROPHILS NFR BLD AUTO: 1.4 10*3/MM3 (ref 1.7–7)
NEUTROPHILS NFR BLD AUTO: 33.6 % (ref 42.7–76)
NEUTROPHILS NFR BLD AUTO: 34 % (ref 42.7–76)
NRBC BLD AUTO-RTO: 0.3 /100 WBC (ref 0–0.2)
NRBC BLD AUTO-RTO: 0.3 /100 WBC (ref 0–0.2)
NT-PROBNP SERPL-MCNC: <5 PG/ML (ref 0–450)
PLATELET # BLD AUTO: 204 10*3/MM3 (ref 140–450)
PLATELET # BLD AUTO: 220 10*3/MM3 (ref 140–450)
PMV BLD AUTO: 8 FL (ref 6–12)
PMV BLD AUTO: 8.2 FL (ref 6–12)
POTASSIUM SERPL-SCNC: 3.7 MMOL/L (ref 3.5–5.2)
POTASSIUM SERPL-SCNC: 3.9 MMOL/L (ref 3.5–5.2)
PROT SERPL-MCNC: 6.8 G/DL (ref 6–8.5)
PROTHROMBIN TIME: 10.9 SECONDS (ref 9.6–11.7)
RBC # BLD AUTO: 5.17 10*6/MM3 (ref 4.14–5.8)
RBC # BLD AUTO: 5.2 10*6/MM3 (ref 4.14–5.8)
SARS-COV-2 RNA PNL SPEC NAA+PROBE: NOT DETECTED
SODIUM SERPL-SCNC: 137 MMOL/L (ref 136–145)
SODIUM SERPL-SCNC: 138 MMOL/L (ref 136–145)
STRESS TARGET HR: 166 BPM
TROPONIN T SERPL-MCNC: <0.01 NG/ML (ref 0–0.03)
TROPONIN T SERPL-MCNC: <0.01 NG/ML (ref 0–0.03)
TSH SERPL DL<=0.05 MIU/L-ACNC: 1.62 UIU/ML (ref 0.27–4.2)
WBC NRBC COR # BLD: 3.6 10*3/MM3 (ref 3.4–10.8)
WBC NRBC COR # BLD: 4.3 10*3/MM3 (ref 3.4–10.8)

## 2022-05-31 PROCEDURE — G0378 HOSPITAL OBSERVATION PER HR: HCPCS

## 2022-05-31 PROCEDURE — 85610 PROTHROMBIN TIME: CPT | Performed by: EMERGENCY MEDICINE

## 2022-05-31 PROCEDURE — 84443 ASSAY THYROID STIM HORMONE: CPT | Performed by: EMERGENCY MEDICINE

## 2022-05-31 PROCEDURE — 85025 COMPLETE CBC W/AUTO DIFF WBC: CPT | Performed by: NURSE PRACTITIONER

## 2022-05-31 PROCEDURE — 82465 ASSAY BLD/SERUM CHOLESTEROL: CPT | Performed by: NURSE PRACTITIONER

## 2022-05-31 PROCEDURE — 99284 EMERGENCY DEPT VISIT MOD MDM: CPT

## 2022-05-31 PROCEDURE — 36415 COLL VENOUS BLD VENIPUNCTURE: CPT | Performed by: EMERGENCY MEDICINE

## 2022-05-31 PROCEDURE — 96361 HYDRATE IV INFUSION ADD-ON: CPT

## 2022-05-31 PROCEDURE — 25010000002 ENOXAPARIN PER 10 MG: Performed by: NURSE PRACTITIONER

## 2022-05-31 PROCEDURE — 80053 COMPREHEN METABOLIC PANEL: CPT | Performed by: EMERGENCY MEDICINE

## 2022-05-31 PROCEDURE — 99204 OFFICE O/P NEW MOD 45 MIN: CPT | Performed by: INTERNAL MEDICINE

## 2022-05-31 PROCEDURE — 83735 ASSAY OF MAGNESIUM: CPT | Performed by: EMERGENCY MEDICINE

## 2022-05-31 PROCEDURE — 83690 ASSAY OF LIPASE: CPT | Performed by: EMERGENCY MEDICINE

## 2022-05-31 PROCEDURE — 71045 X-RAY EXAM CHEST 1 VIEW: CPT

## 2022-05-31 PROCEDURE — C9803 HOPD COVID-19 SPEC COLLECT: HCPCS

## 2022-05-31 PROCEDURE — 87635 SARS-COV-2 COVID-19 AMP PRB: CPT | Performed by: EMERGENCY MEDICINE

## 2022-05-31 PROCEDURE — 96360 HYDRATION IV INFUSION INIT: CPT

## 2022-05-31 PROCEDURE — 85730 THROMBOPLASTIN TIME PARTIAL: CPT | Performed by: EMERGENCY MEDICINE

## 2022-05-31 PROCEDURE — 84484 ASSAY OF TROPONIN QUANT: CPT | Performed by: EMERGENCY MEDICINE

## 2022-05-31 PROCEDURE — 82550 ASSAY OF CK (CPK): CPT | Performed by: NURSE PRACTITIONER

## 2022-05-31 PROCEDURE — 83880 ASSAY OF NATRIURETIC PEPTIDE: CPT | Performed by: NURSE PRACTITIONER

## 2022-05-31 PROCEDURE — 93306 TTE W/DOPPLER COMPLETE: CPT

## 2022-05-31 PROCEDURE — 85379 FIBRIN DEGRADATION QUANT: CPT | Performed by: EMERGENCY MEDICINE

## 2022-05-31 PROCEDURE — 93306 TTE W/DOPPLER COMPLETE: CPT | Performed by: INTERNAL MEDICINE

## 2022-05-31 PROCEDURE — 96372 THER/PROPH/DIAG INJ SC/IM: CPT

## 2022-05-31 PROCEDURE — 86140 C-REACTIVE PROTEIN: CPT | Performed by: NURSE PRACTITIONER

## 2022-05-31 PROCEDURE — 93005 ELECTROCARDIOGRAM TRACING: CPT

## 2022-05-31 PROCEDURE — 85025 COMPLETE CBC W/AUTO DIFF WBC: CPT | Performed by: EMERGENCY MEDICINE

## 2022-05-31 RX ORDER — SODIUM CHLORIDE 0.9 % (FLUSH) 0.9 %
10 SYRINGE (ML) INJECTION AS NEEDED
Status: DISCONTINUED | OUTPATIENT
Start: 2022-05-31 | End: 2022-05-31 | Stop reason: HOSPADM

## 2022-05-31 RX ORDER — ACETAMINOPHEN 325 MG/1
650 TABLET ORAL EVERY 4 HOURS PRN
Status: DISCONTINUED | OUTPATIENT
Start: 2022-05-31 | End: 2022-05-31 | Stop reason: SDUPTHER

## 2022-05-31 RX ORDER — SODIUM CHLORIDE 0.9 % (FLUSH) 0.9 %
10 SYRINGE (ML) INJECTION EVERY 12 HOURS SCHEDULED
Status: DISCONTINUED | OUTPATIENT
Start: 2022-05-31 | End: 2022-05-31 | Stop reason: HOSPADM

## 2022-05-31 RX ORDER — ACETAMINOPHEN 325 MG/1
650 TABLET ORAL EVERY 4 HOURS PRN
Status: DISCONTINUED | OUTPATIENT
Start: 2022-05-31 | End: 2022-05-31 | Stop reason: HOSPADM

## 2022-05-31 RX ORDER — AMOXICILLIN 250 MG
2 CAPSULE ORAL 2 TIMES DAILY
Status: DISCONTINUED | OUTPATIENT
Start: 2022-05-31 | End: 2022-05-31 | Stop reason: HOSPADM

## 2022-05-31 RX ORDER — PANTOPRAZOLE SODIUM 40 MG/1
40 TABLET, DELAYED RELEASE ORAL DAILY
Status: DISCONTINUED | OUTPATIENT
Start: 2022-05-31 | End: 2022-05-31 | Stop reason: HOSPADM

## 2022-05-31 RX ORDER — ONDANSETRON 4 MG/1
4 TABLET, FILM COATED ORAL EVERY 6 HOURS PRN
Status: DISCONTINUED | OUTPATIENT
Start: 2022-05-31 | End: 2022-05-31 | Stop reason: HOSPADM

## 2022-05-31 RX ORDER — CHOLECALCIFEROL (VITAMIN D3) 125 MCG
5 CAPSULE ORAL NIGHTLY PRN
Status: DISCONTINUED | OUTPATIENT
Start: 2022-05-31 | End: 2022-05-31 | Stop reason: HOSPADM

## 2022-05-31 RX ORDER — POLYETHYLENE GLYCOL 3350 17 G/17G
17 POWDER, FOR SOLUTION ORAL DAILY PRN
Status: DISCONTINUED | OUTPATIENT
Start: 2022-05-31 | End: 2022-05-31 | Stop reason: HOSPADM

## 2022-05-31 RX ORDER — BISACODYL 10 MG
10 SUPPOSITORY, RECTAL RECTAL DAILY PRN
Status: DISCONTINUED | OUTPATIENT
Start: 2022-05-31 | End: 2022-05-31 | Stop reason: HOSPADM

## 2022-05-31 RX ORDER — SODIUM CHLORIDE 9 MG/ML
100 INJECTION, SOLUTION INTRAVENOUS CONTINUOUS
Status: DISCONTINUED | OUTPATIENT
Start: 2022-05-31 | End: 2022-05-31

## 2022-05-31 RX ORDER — ONDANSETRON 2 MG/ML
4 INJECTION INTRAMUSCULAR; INTRAVENOUS EVERY 6 HOURS PRN
Status: DISCONTINUED | OUTPATIENT
Start: 2022-05-31 | End: 2022-05-31 | Stop reason: SDUPTHER

## 2022-05-31 RX ORDER — ONDANSETRON 2 MG/ML
4 INJECTION INTRAMUSCULAR; INTRAVENOUS EVERY 6 HOURS PRN
Status: DISCONTINUED | OUTPATIENT
Start: 2022-05-31 | End: 2022-05-31 | Stop reason: HOSPADM

## 2022-05-31 RX ORDER — BISACODYL 5 MG/1
5 TABLET, DELAYED RELEASE ORAL DAILY PRN
Status: DISCONTINUED | OUTPATIENT
Start: 2022-05-31 | End: 2022-05-31 | Stop reason: HOSPADM

## 2022-05-31 RX ORDER — ENOXAPARIN SODIUM 100 MG/ML
40 INJECTION SUBCUTANEOUS EVERY 24 HOURS
Status: DISCONTINUED | OUTPATIENT
Start: 2022-05-31 | End: 2022-05-31 | Stop reason: HOSPADM

## 2022-05-31 RX ADMIN — SENNOSIDES AND DOCUSATE SODIUM 2 TABLET: 50; 8.6 TABLET ORAL at 08:13

## 2022-05-31 RX ADMIN — ENOXAPARIN SODIUM 40 MG: 100 INJECTION SUBCUTANEOUS at 15:30

## 2022-05-31 RX ADMIN — PANTOPRAZOLE SODIUM 40 MG: 40 TABLET, DELAYED RELEASE ORAL at 08:14

## 2022-05-31 RX ADMIN — SODIUM CHLORIDE 100 ML/HR: 9 INJECTION, SOLUTION INTRAVENOUS at 08:14

## 2022-05-31 RX ADMIN — Medication 10 ML: at 08:14

## 2022-05-31 RX ADMIN — SODIUM CHLORIDE 500 ML: 9 INJECTION, SOLUTION INTRAVENOUS at 01:19

## 2022-06-01 ENCOUNTER — TELEPHONE (OUTPATIENT)
Dept: GASTROENTEROLOGY | Facility: CLINIC | Age: 27
End: 2022-06-01

## 2022-06-01 ENCOUNTER — TRANSITIONAL CARE MANAGEMENT TELEPHONE ENCOUNTER (OUTPATIENT)
Dept: CALL CENTER | Facility: HOSPITAL | Age: 27
End: 2022-06-01

## 2022-06-01 ENCOUNTER — HOSPITAL ENCOUNTER (OUTPATIENT)
Dept: RESPIRATORY THERAPY | Facility: HOSPITAL | Age: 27
Discharge: HOME | End: 2022-06-01

## 2022-06-01 DIAGNOSIS — R00.2 PALPITATIONS: ICD-10-CM

## 2022-06-01 LAB — QT INTERVAL: 315 MS

## 2022-06-01 NOTE — CASE MANAGEMENT/SOCIAL WORK
Case Management Discharge Note        Transportation Services  Private: Car    Final Discharge Disposition Code: 01 - home or self-care

## 2022-06-01 NOTE — TELEPHONE ENCOUNTER
"Patient has been having some chest pain and he went into [facility] and they advised him to stop taking the below listed .     In return he wanted to know if there was anything the provider could prescribe that would help him better       lansoprazole (PREVACID) 30 MG capsule     pantoprazole (PROTONIX) 40 MG EC tablet      are the two meds above that he takes   He was in the ED 05/31/2022  CHIEF COMPLAINT:      Palpitations    HISTORY OF PRESENT ILLNESS:     [facility]  ED 5/31/22: Patient is a pleasant 27-year-old.  For the last 4 days he has had palpitations.  He states that he does not have any excessive caffeine.  He does not smoke.  There is been no new stimulants that he is taken.  He has noted just that it feels like he \"is being scared repeatedly\".  It happens at random times.  Multiple times a minute when it happens.  He has not passed out.  Feels uncomfortable in his upper abdomen.  But no definitive chest pain or shortness of breath.  No pain when he breathes in.  No early onset heart disease.    Patients phone number is correct in chart for call back   "

## 2022-06-01 NOTE — OUTREACH NOTE
Call Center TCM Note    Flowsheet Row Responses   [facility] patient discharged from? [facility]   Does the patient have one of the following disease processes/diagnoses(primary or secondary)? Other   TCM attempt successful? No   Unsuccessful attempts Attempt 1          Ana Ty MA    6/1/2022, 12:16 EDT

## 2022-06-01 NOTE — OUTREACH NOTE
Call Center TCM Note    Flowsheet Row Responses   [facility] patient discharged from? [facility]   Does the patient have one of the following disease processes/diagnoses(primary or secondary)? Other   TCM attempt successful? Yes   Discharge diagnosis palpitations   Meds reviewed with patient/caregiver? Yes   Does the patient have all medications ordered at discharge? N/A   Does the patient have a primary care provider?  Yes   Does the patient have an appointment with their PCP within 7 days of discharge? No   Comments regarding PCP pcp dr brewer has no appts within TCM time frame, if ofc can review sched and  call pt for TCM APPT to be completed by 06/14/2022.   Has the patient kept scheduled appointments due by today? N/A   Has home health visited the patient within 72 hours of discharge? N/A   Psychosocial issues? No   Nursing interventions Reviewed instructions with patient   What is the patient's perception of their health status since discharge? Improving   Is the patient/caregiver able to teach back signs and symptoms related to disease process for when to call PCP? Yes   Is the patient/caregiver able to teach back signs and symptoms related to disease process for when to call 911? Yes   Is the patient/caregiver able to teach back the hierarchy of who to call/visit for symptoms/problems? PCP, Specialist, Home health nurse, Urgent Care, ED, 911 Yes   If the patient is a current smoker, are they able to teach back resources for cessation? Not a smoker   TCM call completed? Yes   Wrap up additional comments Pt feeling ok, heart monitor placement today, has stress test on 06/07/2022. No questions at this time. pcp dr brewer has no appts within TCM time frame, if ofc can review sched and  call pt for TCM APPT to be completed by 06/14/2022.          Ana Ty MA    6/1/2022, 16:05 EDT

## 2022-06-02 ENCOUNTER — APPOINTMENT (OUTPATIENT)
Dept: RESPIRATORY THERAPY | Facility: HOSPITAL | Age: 27
End: 2022-06-02

## 2022-06-07 ENCOUNTER — APPOINTMENT (OUTPATIENT)
Dept: CARDIOLOGY | Facility: HOSPITAL | Age: 27
End: 2022-06-07

## 2022-06-07 NOTE — TELEPHONE ENCOUNTER
Per Dr. Campoverde:  Sounds like he could be having panic attacks, maybe he should see his PCP.   He should not be on Protonix AND prevacid.  Did he complete the treatment for H pylori, if so, we will await his breath test and he could try pepcid 20mg/day if he is having hearburn or indigestion

## 2022-06-17 DIAGNOSIS — R94.31 ABNORMAL HOLTER EXAM: Primary | ICD-10-CM

## 2022-06-17 LAB
Lab: 44
TOAL ENROLLMENT DAYS: 14

## 2022-06-17 PROCEDURE — 93228 REMOTE 30 DAY ECG REV/REPORT: CPT | Performed by: INTERNAL MEDICINE

## 2022-07-01 ENCOUNTER — APPOINTMENT (OUTPATIENT)
Dept: CARDIOLOGY | Facility: HOSPITAL | Age: 27
End: 2022-07-01

## 2022-07-29 ENCOUNTER — PATIENT ROUNDING (BHMG ONLY) (OUTPATIENT)
Dept: CARDIOLOGY | Facility: CLINIC | Age: 27
End: 2022-07-29

## 2022-07-29 NOTE — PROGRESS NOTES
A My Chart message has been sent to the patient for PATIENT ROUNDING with [facility] [place] [facility]

## 2022-11-02 ENCOUNTER — DOCUMENTATION (OUTPATIENT)
Dept: PHYSICAL THERAPY | Facility: CLINIC | Age: 27
End: 2022-11-02

## 2022-11-02 DIAGNOSIS — M54.50 LUMBAR PAIN: Primary | ICD-10-CM

## 2022-11-02 NOTE — PROGRESS NOTES
Discharge Summary  Discharge Summary from Physical Therapy Report  [address], [address], [place] [address]    Patient Information  Jd Garcia  1995    Dates  PT visit: 02/02/2022 - 02/10/2022  Number of Visits: 2     Discharge Status of Patient: See final Note dated 02/10/2022    Goals: Not Met    Visit Diagnoses:    ICD-10-CM ICD-9-CM   1. Lumbar pain  M54.50 724.2       Discharge Plan: - patient did not return to complete PT episode of care    Date of Discharge 11/02/2022        Nahomi Arellano PT, DPT, OCS  Physical Therapist  [place] #[id number]  Electronically Signed by: Nahomi Arellano PT, 11/02/22, 1:15 PM EDT

## 2025-03-18 ENCOUNTER — HOSPITAL ENCOUNTER (EMERGENCY)
Facility: HOSPITAL | Age: 30
Discharge: HOME | End: 2025-03-18
Attending: EMERGENCY MEDICINE | Admitting: EMERGENCY MEDICINE
Payer: COMMERCIAL

## 2025-03-18 VITALS
RESPIRATION RATE: 18 BRPM | HEART RATE: 95 BPM | DIASTOLIC BLOOD PRESSURE: 83 MMHG | HEIGHT: 73 IN | WEIGHT: 214 LBS | TEMPERATURE: 98.7 F | OXYGEN SATURATION: 95 % | SYSTOLIC BLOOD PRESSURE: 123 MMHG | BODY MASS INDEX: 28.36 KG/M2

## 2025-03-18 DIAGNOSIS — J10.1 INFLUENZA A: Primary | ICD-10-CM

## 2025-03-18 LAB
FLUAV SUBTYP SPEC NAA+PROBE: DETECTED
FLUBV RNA ISLT QL NAA+PROBE: NOT DETECTED
SARS-COV-2 RNA RESP QL NAA+PROBE: NOT DETECTED

## 2025-03-18 PROCEDURE — 99283 EMERGENCY DEPT VISIT LOW MDM: CPT

## 2025-03-18 PROCEDURE — 87636 SARSCOV2 & INF A&B AMP PRB: CPT | Performed by: EMERGENCY MEDICINE

## 2025-03-18 RX ORDER — IBUPROFEN 400 MG/1
400 TABLET, FILM COATED ORAL ONCE
Status: COMPLETED | OUTPATIENT
Start: 2025-03-18 | End: 2025-03-18

## 2025-03-18 RX ORDER — ACETAMINOPHEN 500 MG
500 TABLET ORAL ONCE
Status: COMPLETED | OUTPATIENT
Start: 2025-03-18 | End: 2025-03-18

## 2025-03-18 RX ADMIN — IBUPROFEN 400 MG: 400 TABLET, FILM COATED ORAL at 22:42

## 2025-03-18 RX ADMIN — ACETAMINOPHEN 500 MG: 500 TABLET, FILM COATED ORAL at 22:42

## 2025-03-18 NOTE — Clinical Note
[facility]  [address]  [place] [address]    Jd Garcia was seen and treated in our emergency department on 3/18/2025.  He may return to work on 03/21/2025.         Thank you for choosing [facility].    Cliff Arreola MD

## 2025-03-19 NOTE — FSED PROVIDER NOTE
Subjective   History of Present Illness  Patient's had fever cough body aches for the last 96 hours.  His wife or significant other was sick before but she is better.  Patient was sent home from [facility] where he works because he is exposing everyone so they did not want him there.      Review of Systems   Constitutional:  Positive for fever.   Respiratory:  Positive for cough.    Musculoskeletal:  Positive for myalgias.   All other systems reviewed and are negative.      Past Medical History:   Diagnosis Date    H. pylori infection 05/10/2022    CURRENTLY BEING TREATED    Kidney stone     Low back pain        Allergies   Allergen Reactions    Ibuprofen GI Intolerance     constipation       No past surgical history on file.    Family History   Problem Relation Age of Onset    No Known Problems Mother     No Known Problems Sister        Social History     Socioeconomic History    Marital status: Single   Tobacco Use    Smoking status: Never    Smokeless tobacco: Never   Substance and Sexual Activity    Alcohol use: Not Currently     Comment: rarely     Drug use: Never    Sexual activity: Defer           Objective   Physical Exam  Vitals and nursing note reviewed.   Constitutional:       General: He is not in acute distress.     Appearance: Normal appearance. He is not ill-appearing, toxic-appearing or diaphoretic.   HENT:      Head: Normocephalic and atraumatic.      Nose: Nose normal.      Mouth/Throat:      Mouth: Mucous membranes are moist.   Eyes:      Extraocular Movements: Extraocular movements intact.      Pupils: Pupils are equal, round, and reactive to light.   Cardiovascular:      Rate and Rhythm: Normal rate and regular rhythm.      Pulses: Normal pulses.      Heart sounds: Normal heart sounds.   Pulmonary:      Effort: Pulmonary effort is normal. No respiratory distress.      Breath sounds: Normal breath sounds. No stridor. No wheezing or rhonchi.   Abdominal:      General: Bowel sounds are normal.       Palpations: Abdomen is soft.   Musculoskeletal:         General: Normal range of motion.      Cervical back: Normal range of motion and neck supple. No rigidity or tenderness.   Lymphadenopathy:      Cervical: No cervical adenopathy.   Skin:     General: Skin is warm and dry.      Capillary Refill: Capillary refill takes less than 2 seconds.   Neurological:      General: No focal deficit present.      Mental Status: He is alert and oriented to person, place, and time.   Psychiatric:         Mood and Affect: Mood normal.         Procedures           ED Course                                           Medical Decision Making  Patient is influenza A positive I have given him Tylenol ibuprofen I will place him on a schedule he is off work until Friday he is in stable condition for discharge salt I have already talked to them about vitamin C D and zinc.    Amount and/or Complexity of Data Reviewed  Labs:      Details: Patient is influenza A positive influenza B negative and COVID-negative    Risk  OTC drugs.  Prescription drug management.        Final diagnoses:   Influenza A       ED Disposition  ED Disposition       ED Disposition   Discharge    Condition   Stable    Comment   --               Provider, No Known  [place] [id number] [phone]    In 1 week  As needed         Medication List      No changes were made to your prescriptions during this visit.

## 2025-03-19 NOTE — DISCHARGE INSTRUCTIONS
Take Tylenol 650 mg at 11 PM 3 AM 7 AM 11 AM 3 PM 7 PM around-the-clock on its own schedule.  Take ibuprofen at 11 PM 5 AM 11 AM 5 PM around-the-clock on its own schedule.  Do not alternate Tylenol and ibuprofen they may be taken on the schedule on their own.  Take vitamin C 1000 mg twice a day vitamin D 5000 international units once a day and zinc 50 mg once a day while you are sick.  May return to work on Friday.  70 ounces of water per day.